# Patient Record
Sex: FEMALE | Race: WHITE | Employment: OTHER | URBAN - METROPOLITAN AREA
[De-identification: names, ages, dates, MRNs, and addresses within clinical notes are randomized per-mention and may not be internally consistent; named-entity substitution may affect disease eponyms.]

---

## 2022-09-28 ENCOUNTER — HOSPITAL ENCOUNTER (EMERGENCY)
Age: 77
Discharge: HOME | End: 2022-09-28
Attending: EMERGENCY MEDICINE
Payer: MEDICARE

## 2022-09-28 ENCOUNTER — APPOINTMENT (OUTPATIENT)
Dept: CT IMAGING | Age: 77
End: 2022-09-28
Payer: MEDICARE

## 2022-09-28 VITALS
HEIGHT: 57 IN | SYSTOLIC BLOOD PRESSURE: 174 MMHG | BODY MASS INDEX: 27.18 KG/M2 | DIASTOLIC BLOOD PRESSURE: 87 MMHG | WEIGHT: 126 LBS | OXYGEN SATURATION: 97 % | RESPIRATION RATE: 16 BRPM | TEMPERATURE: 97.8 F | HEART RATE: 96 BPM

## 2022-09-28 DIAGNOSIS — S02.2XXA CLOSED FRACTURE OF NASAL BONE, INITIAL ENCOUNTER: ICD-10-CM
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
## 2022-09-28 PROCEDURE — 70486 CT MAXILLOFACIAL W/O DYE: CPT

## 2022-09-28 PROCEDURE — 99285 EMERGENCY DEPT VISIT HI MDM: CPT

## 2022-09-28 PROCEDURE — 70450 CT HEAD/BRAIN W/O DYE: CPT

## 2022-09-28 PROCEDURE — 72125 CT NECK SPINE W/O DYE: CPT

## 2022-09-28 PROCEDURE — 6830039000 HC L3 TRAUMA ALERT

## 2022-09-28 RX ORDER — TRAMADOL HYDROCHLORIDE 50 MG/1
50 TABLET ORAL EVERY 4 HOURS PRN
Qty: 18 TABLET | Refills: 0 | Status: SHIPPED | OUTPATIENT
Start: 2022-09-28 | End: 2022-10-01

## 2022-09-28 ASSESSMENT — ENCOUNTER SYMPTOMS
ABDOMINAL PAIN: 0
VOMITING: 0
BACK PAIN: 0
NAUSEA: 0
DIARRHEA: 0
SORE THROAT: 0
SHORTNESS OF BREATH: 0
COUGH: 0

## 2022-09-28 NOTE — ED PROVIDER NOTES
[address] [facility] ED  eMERGENCYdEPARTMENT eNCOUnter      Pt Name: Taye Zarate  MRN: [id number]  Felixgfkathryn 1945  Date of evaluation: 9/28/2022  Vitaly Quach MD    CHIEF COMPLAINT           HPI  Taye Zarate is a 78 y.o. female per chart review has no pmh presents to the ED s/p fall. Pt notes just prior to arrival she tripped at [facility] and fell forward. Pt denies LOC. Pt notes mild, constant, aching, nose pain. Pt denies fever, neck pain, cp, sob, ab pain, dysuria, diarrhea. ROS  Review of Systems   Constitutional:  Negative for activity change, chills and fever. HENT:  Negative for ear pain and sore throat. Nose pain   Eyes:  Negative for visual disturbance. Respiratory:  Negative for cough and shortness of breath. Cardiovascular:  Negative for chest pain, palpitations and leg swelling. Gastrointestinal:  Negative for abdominal pain, diarrhea, nausea and vomiting. Genitourinary:  Negative for dysuria. Musculoskeletal:  Negative for back pain. Skin:  Negative for rash. Neurological:  Negative for dizziness and weakness. Except as noted above the remainder of the review of systems was reviewed and negative. PAST MEDICAL HISTORY   No past medical history on file. SURGICAL HISTORY     No past surgical history on file. CURRENTMEDICATIONS       Previous Medications    No medications on file       ALLERGIES     Patient has no known allergies. FAMILY HISTORY     No family history on file. SOCIAL HISTORY       Social History     Socioeconomic History    Marital status:          PHYSICAL EXAM       ED Triage Vitals   BP Temp Temp src Pulse Resp SpO2 Height Weight   -- -- -- -- -- -- -- --       Physical Exam  Vitals and nursing note reviewed. Constitutional:       Appearance: She is well-developed. HENT:      Head: Normocephalic.       Right Ear: External ear normal.      Left Ear: External ear normal.   Eyes:      Conjunctiva/sclera: Conjunctivae normal.      Pupils: Pupils are equal, round, and reactive to light. Cardiovascular:      Rate and Rhythm: Normal rate and regular rhythm. Heart sounds: Normal heart sounds. Pulmonary:      Effort: Pulmonary effort is normal.      Breath sounds: Normal breath sounds. Abdominal:      General: Bowel sounds are normal. There is no distension. Palpations: Abdomen is soft. Tenderness: no abdominal tenderness   Musculoskeletal:         General: Normal range of motion. Cervical back: Normal range of motion and neck supple. No tenderness. Skin:     General: Skin is warm and dry. Neurological:      Mental Status: She is alert and oriented to person, place, and time. Psychiatric:         Mood and Affect: Mood normal.         MDM  79 yo female presents to the ED s/p mechanical fall. Pt is afebrile, hemodynamically stable. Pt did not want anything for pain. CT head, cspine negative. CT facial bones shows subtle fx of nasal bone. Pt reassessed and doing well. Pt's wounds cleaned. Pt given prescription for tramadol, given fall warning signs and will f/u with pcp. FINAL IMPRESSION      1. Fall, initial encounter    2.  Closed fracture of nasal bone, initial encounter          DISPOSITION/PLAN   DISPOSITION Decision To Discharge 09/28/2022 02:46:43 PM        DISCHARGE MEDICATIONS:  [unfilled]         Analy Moran MD(electronically signed)  Attending Emergency Physician            Analy Moran MD  09/28/22 4213

## 2022-09-28 NOTE — ED TRIAGE NOTES
Pt arrived via EMS. Pt states she was walking into [facility] and tripped and fell hitting her face on the ground. Pt has laceration to bridge of nose and a bloody nose PTA. Pt denies any LOC or thinners   Pt denies any head, neck or back pain.

## 2022-10-08 ENCOUNTER — APPOINTMENT (OUTPATIENT)
Dept: CT IMAGING | Age: 77
End: 2022-10-08
Payer: MEDICARE

## 2022-10-08 ENCOUNTER — HOSPITAL ENCOUNTER (EMERGENCY)
Age: 77
Discharge: HOME | End: 2022-10-08
Attending: EMERGENCY MEDICINE
Payer: MEDICARE

## 2022-10-08 VITALS
HEART RATE: 93 BPM | BODY MASS INDEX: 27.18 KG/M2 | OXYGEN SATURATION: 98 % | HEIGHT: 57 IN | SYSTOLIC BLOOD PRESSURE: 148 MMHG | DIASTOLIC BLOOD PRESSURE: 57 MMHG | WEIGHT: 126 LBS | RESPIRATION RATE: 20 BRPM | TEMPERATURE: 98.9 F

## 2022-10-08 DIAGNOSIS — S09.90XA CLOSED HEAD INJURY, INITIAL ENCOUNTER: Primary | ICD-10-CM
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
## 2022-10-08 PROCEDURE — 99285 EMERGENCY DEPT VISIT HI MDM: CPT

## 2022-10-08 PROCEDURE — 70450 CT HEAD/BRAIN W/O DYE: CPT

## 2022-10-08 PROCEDURE — 72125 CT NECK SPINE W/O DYE: CPT

## 2022-10-08 ASSESSMENT — ENCOUNTER SYMPTOMS
BACK PAIN: 0
VOMITING: 0
COUGH: 0
ABDOMINAL PAIN: 0
SHORTNESS OF BREATH: 0
NAUSEA: 0
SORE THROAT: 0
DIARRHEA: 0

## 2022-10-08 ASSESSMENT — PAIN DESCRIPTION - PAIN TYPE: TYPE: ACUTE PAIN

## 2022-10-08 ASSESSMENT — PAIN DESCRIPTION - LOCATION: LOCATION: HEAD

## 2022-10-08 ASSESSMENT — PAIN DESCRIPTION - ORIENTATION: ORIENTATION: POSTERIOR

## 2022-10-08 ASSESSMENT — PAIN - FUNCTIONAL ASSESSMENT: PAIN_FUNCTIONAL_ASSESSMENT: 0-10

## 2022-10-08 NOTE — ED PROVIDER NOTES
[address] [facility] ED  eMERGENCYdEPARTMENT eNCOUnter      Pt Name: Obdulio Frenhc  MRN: [id number]  Felixgfkathryn 1945  Date of evaluation: 10/8/2022  Surekha Dc MD    CHIEF COMPLAINT           HPI  Obdulio French is a 78 y.o. female per chart review has a h/o DM II, HTN, Hpl, OA presents to the ED s/p fall. Pt notes she was vacuuming just prior to arrival and she fell backwards and hit her head. Pt denies headache, LOC, neck pain, cp, sob, ab pain, dysuria, diarrhea. ROS  Review of Systems   Constitutional:  Negative for activity change, chills and fever. HENT:  Negative for ear pain and sore throat. Eyes:  Negative for visual disturbance. Respiratory:  Negative for cough and shortness of breath. Cardiovascular:  Negative for chest pain, palpitations and leg swelling. Gastrointestinal:  Negative for abdominal pain, diarrhea, nausea and vomiting. Genitourinary:  Negative for dysuria. Musculoskeletal:  Negative for back pain. Skin:  Negative for rash. Neurological:  Negative for dizziness and weakness. Except as noted above the remainder of the review of systems was reviewed and negative. PAST MEDICAL HISTORY     Past Medical History:   Diagnosis Date    Arthritis     Diabetes mellitus ([address]     Hyperlipidemia     Hypertension          SURGICAL HISTORY       Past Surgical History:   Procedure Laterality Date    HYSTERECTOMY (CERVIX STATUS UNKNOWN)           CURRENTMEDICATIONS       Previous Medications    No medications on file       ALLERGIES     Patient has no known allergies. FAMILY HISTORY     History reviewed. No pertinent family history. SOCIAL HISTORY       Social History     Socioeconomic History    Marital status:       Spouse name: None    Number of children: None    Years of education: None    Highest education level: None   Tobacco Use    Smoking status: Never    Smokeless tobacco: Never   Substance and Sexual Activity    Alcohol use: Never Drug use: Never         PHYSICAL EXAM       ED Triage Vitals [10/08/22 1420]   BP Temp Temp Source Heart Rate Resp SpO2 Height Weight   (!) 156/78 98.9 °F (37.2 °C) Oral 98 20 98 % 4' 9\" (1.448 m) 126 lb (57.2 kg)       Physical Exam  Vitals and nursing note reviewed. Constitutional:       Appearance: She is well-developed. HENT:      Head: Normocephalic. Comments: 1 cm superficial laceration noted on back of head. No active bleeding. Right Ear: External ear normal.      Left Ear: External ear normal.   Eyes:      Conjunctiva/sclera: Conjunctivae normal.      Pupils: Pupils are equal, round, and reactive to light. Cardiovascular:      Rate and Rhythm: Normal rate and regular rhythm. Heart sounds: Normal heart sounds. Pulmonary:      Effort: Pulmonary effort is normal.      Breath sounds: Normal breath sounds. Abdominal:      General: Bowel sounds are normal. There is no distension. Palpations: Abdomen is soft. Tenderness: no abdominal tenderness   Musculoskeletal:         General: Normal range of motion. Cervical back: Normal range of motion and neck supple. No tenderness. Skin:     General: Skin is warm and dry. Neurological:      Mental Status: She is alert and oriented to person, place, and time. Psychiatric:         Mood and Affect: Mood normal.         MDM  77 yo female presents to the ED s/p fall with closed head injury. Pt is afebrile, hemodynamically stable. Pt did not want anything for pain. CT head, cspine negative. Pt reassessed and doing well. Pt's wound cleaned and dressed in the ED. Pt given fall, closed head injury warning signs and will f/u with pcp. FINAL IMPRESSION      1. Closed head injury, initial encounter    2.  Fall, initial encounter          DISPOSITION/PLAN   DISPOSITION Decision To Discharge 10/08/2022 03:27:10 PM        DISCHARGE MEDICATIONS:  [unfilled]         Lidia Nettles MD(electronically signed)  Attending Emergency Physician            Jose Hernandez MD  10/08/22 8091

## 2022-10-08 NOTE — ED NOTES
Pt resting in bed. Family bedside. No obvious s/s of distress/deformities noted.       Raghavendra Guevara RN  10/08/22 [id number]

## 2022-10-08 NOTE — ED NOTES
Pt taken to ct via ASHLIE Hayden by this RN at this time.       Claudia Painting, NIRU  10/08/22 3426

## 2022-10-08 NOTE — ED TRIAGE NOTES
Pt to ed from hoem via triage with c/o mild head pain after fall. Pt states she was vacuuming and had a fall and hit head on the ground. 1/2 in lac noted to posterior scalp, bleeding controlled. Pt denies LOC, pt denies blood thinner use. Pt denies nausea, headache, vision changes or dizziness  Respirations even and unlabored.  Skin WDI  No s/s of distress noted

## 2023-02-01 PROBLEM — N95.2 ATROPHIC VULVOVAGINITIS: Status: ACTIVE | Noted: 2023-02-01

## 2023-02-01 PROBLEM — H90.6 MIXED HEARING LOSS, BILATERAL: Status: ACTIVE | Noted: 2023-02-01

## 2023-02-01 PROBLEM — E66.3 OVERWEIGHT WITH BODY MASS INDEX (BMI) OF 28 TO 28.9 IN ADULT: Status: ACTIVE | Noted: 2023-02-01

## 2023-02-01 PROBLEM — R79.89 ELEVATED PLATELET COUNT: Status: ACTIVE | Noted: 2023-02-01

## 2023-02-01 PROBLEM — H72.93 PERFORATION OF BOTH TYMPANIC MEMBRANES: Status: ACTIVE | Noted: 2023-02-01

## 2023-02-01 PROBLEM — Z90.710 HISTORY OF HYSTERECTOMY FOR BENIGN DISEASE: Status: ACTIVE | Noted: 2023-02-01

## 2023-02-01 PROBLEM — H72.90 TYMPANIC MEMBRANE PERFORATION: Status: ACTIVE | Noted: 2023-02-01

## 2023-02-01 PROBLEM — E11.9 TYPE 2 DIABETES MELLITUS WITHOUT COMPLICATION, WITHOUT LONG-TERM CURRENT USE OF INSULIN (MULTI): Status: ACTIVE | Noted: 2023-02-01

## 2023-02-01 PROBLEM — I10 ESSENTIAL HYPERTENSION: Status: ACTIVE | Noted: 2023-02-01

## 2023-02-01 PROBLEM — I87.8 VENOUS STASIS: Status: ACTIVE | Noted: 2023-02-01

## 2023-02-01 PROBLEM — H66.90 CHRONIC OTITIS MEDIA: Status: ACTIVE | Noted: 2023-02-01

## 2023-02-01 PROBLEM — M25.569 KNEE PAIN: Status: ACTIVE | Noted: 2023-02-01

## 2023-02-01 PROBLEM — R27.0 ATAXIA: Status: ACTIVE | Noted: 2023-02-01

## 2023-02-01 PROBLEM — R29.6 FREQUENT FALLS: Status: ACTIVE | Noted: 2023-02-01

## 2023-02-01 PROBLEM — E66.3 OVERWEIGHT WITH BODY MASS INDEX (BMI) OF 25 TO 25.9 IN ADULT: Status: ACTIVE | Noted: 2023-02-01

## 2023-02-01 PROBLEM — H92.10 OTORRHEA: Status: ACTIVE | Noted: 2023-02-01

## 2023-02-01 PROBLEM — N81.9 PROLAPSE OF FEMALE PELVIC ORGANS: Status: ACTIVE | Noted: 2023-02-01

## 2023-02-01 PROBLEM — E66.3 OVERWEIGHT WITH BODY MASS INDEX (BMI) OF 27 TO 27.9 IN ADULT: Status: ACTIVE | Noted: 2023-02-01

## 2023-02-01 PROBLEM — H91.90 HEARING LOSS: Status: ACTIVE | Noted: 2023-02-01

## 2023-02-01 PROBLEM — B35.1 NAIL FUNGUS: Status: ACTIVE | Noted: 2023-02-01

## 2023-02-01 RX ORDER — CARVEDILOL 12.5 MG/1
1 TABLET ORAL
COMMUNITY
Start: 2022-01-11 | End: 2023-07-27 | Stop reason: SDUPTHER

## 2023-02-01 RX ORDER — METFORMIN HYDROCHLORIDE EXTENDED-RELEASE TABLETS 1000 MG/1
1000 TABLET, FILM COATED, EXTENDED RELEASE ORAL DAILY
COMMUNITY

## 2023-02-01 RX ORDER — VIT C/E/ZN/COPPR/LUTEIN/ZEAXAN 250MG-90MG
1 CAPSULE ORAL 2 TIMES DAILY
COMMUNITY
Start: 2021-09-02

## 2023-02-01 RX ORDER — PREDNISONE 20 MG/1
20 TABLET ORAL DAILY
COMMUNITY
End: 2023-03-15 | Stop reason: ALTCHOICE

## 2023-02-01 RX ORDER — TURMERIC ROOT EXTRACT 500 MG
1 TABLET ORAL DAILY
COMMUNITY
Start: 2021-09-02

## 2023-02-01 RX ORDER — DULAGLUTIDE 0.75 MG/.5ML
INJECTION, SOLUTION SUBCUTANEOUS
COMMUNITY
End: 2023-11-30 | Stop reason: SDUPTHER

## 2023-02-01 RX ORDER — HYDROCHLOROTHIAZIDE 25 MG/1
1 TABLET ORAL DAILY
COMMUNITY
Start: 2022-03-01 | End: 2023-07-05 | Stop reason: SDUPTHER

## 2023-02-01 RX ORDER — LISINOPRIL 40 MG/1
1 TABLET ORAL DAILY
COMMUNITY
Start: 2022-03-01 | End: 2023-06-01 | Stop reason: SDUPTHER

## 2023-02-01 RX ORDER — PHENOL 1.4 %
1 AEROSOL, SPRAY (ML) MUCOUS MEMBRANE DAILY
COMMUNITY
Start: 2021-09-02

## 2023-02-01 RX ORDER — ASPIRIN 325 MG
1 TABLET, DELAYED RELEASE (ENTERIC COATED) ORAL DAILY
COMMUNITY
Start: 2021-09-02

## 2023-02-01 RX ORDER — ESTRADIOL 0.1 MG/G
CREAM VAGINAL
COMMUNITY
Start: 2022-10-25 | End: 2023-03-15 | Stop reason: ALTCHOICE

## 2023-02-01 RX ORDER — GLIPIZIDE 10 MG/1
1 TABLET ORAL
COMMUNITY
Start: 2022-03-29 | End: 2023-07-05 | Stop reason: SDUPTHER

## 2023-02-01 RX ORDER — TRIAMCINOLONE ACETONIDE 1 MG/G
CREAM TOPICAL
COMMUNITY
Start: 2022-10-11

## 2023-02-01 RX ORDER — BLOOD-GLUCOSE METER
1 EACH MISCELLANEOUS 2 TIMES DAILY
COMMUNITY
Start: 2022-03-01 | End: 2023-12-11 | Stop reason: SDUPTHER

## 2023-02-01 RX ORDER — LANCETS 33 GAUGE
1 EACH MISCELLANEOUS 2 TIMES DAILY
COMMUNITY

## 2023-02-01 RX ORDER — AMLODIPINE BESYLATE 10 MG/1
1 TABLET ORAL DAILY
COMMUNITY
Start: 2022-03-01 | End: 2023-11-30 | Stop reason: SDUPTHER

## 2023-03-13 ENCOUNTER — LAB (OUTPATIENT)
Dept: LAB | Facility: LAB | Age: 78
End: 2023-03-13
Payer: MEDICARE

## 2023-03-13 DIAGNOSIS — E11.9 TYPE 2 DIABETES MELLITUS WITHOUT COMPLICATION, WITHOUT LONG-TERM CURRENT USE OF INSULIN (MULTI): ICD-10-CM

## 2023-03-13 LAB
ALANINE AMINOTRANSFERASE (SGPT) (U/L) IN SER/PLAS: 22 U/L (ref 7–45)
ALBUMIN (G/DL) IN SER/PLAS: 4.7 G/DL (ref 3.4–5)
ALKALINE PHOSPHATASE (U/L) IN SER/PLAS: 72 U/L (ref 33–136)
ANION GAP IN SER/PLAS: 13 MMOL/L (ref 10–20)
ASPARTATE AMINOTRANSFERASE (SGOT) (U/L) IN SER/PLAS: 23 U/L (ref 9–39)
BILIRUBIN TOTAL (MG/DL) IN SER/PLAS: 1.1 MG/DL (ref 0–1.2)
CALCIUM (MG/DL) IN SER/PLAS: 11 MG/DL (ref 8.6–10.3)
CARBON DIOXIDE, TOTAL (MMOL/L) IN SER/PLAS: 30 MMOL/L (ref 21–32)
CHLORIDE (MMOL/L) IN SER/PLAS: 100 MMOL/L (ref 98–107)
CREATININE (MG/DL) IN SER/PLAS: 0.68 MG/DL (ref 0.5–1.05)
GFR FEMALE: 90 ML/MIN/1.73M2
GLUCOSE (MG/DL) IN SER/PLAS: 93 MG/DL (ref 74–99)
POTASSIUM (MMOL/L) IN SER/PLAS: 4.7 MMOL/L (ref 3.5–5.3)
PROTEIN TOTAL: 7.9 G/DL (ref 6.4–8.2)
SODIUM (MMOL/L) IN SER/PLAS: 138 MMOL/L (ref 136–145)
UREA NITROGEN (MG/DL) IN SER/PLAS: 15 MG/DL (ref 6–23)

## 2023-03-13 PROCEDURE — 36415 COLL VENOUS BLD VENIPUNCTURE: CPT

## 2023-03-13 PROCEDURE — 83036 HEMOGLOBIN GLYCOSYLATED A1C: CPT

## 2023-03-13 PROCEDURE — 80053 COMPREHEN METABOLIC PANEL: CPT

## 2023-03-14 LAB
ESTIMATED AVERAGE GLUCOSE FOR HBA1C: 180 MG/DL
HEMOGLOBIN A1C/HEMOGLOBIN TOTAL IN BLOOD: 7.9 %

## 2023-03-15 ENCOUNTER — OFFICE VISIT (OUTPATIENT)
Dept: PRIMARY CARE | Facility: CLINIC | Age: 78
End: 2023-03-15
Payer: MEDICARE

## 2023-03-15 VITALS
DIASTOLIC BLOOD PRESSURE: 62 MMHG | HEART RATE: 88 BPM | WEIGHT: 117 LBS | OXYGEN SATURATION: 98 % | HEIGHT: 57 IN | SYSTOLIC BLOOD PRESSURE: 120 MMHG | RESPIRATION RATE: 16 BRPM | BODY MASS INDEX: 25.24 KG/M2 | TEMPERATURE: 97 F

## 2023-03-15 DIAGNOSIS — R79.89 ELEVATED PLATELET COUNT: ICD-10-CM

## 2023-03-15 DIAGNOSIS — Z00.00 ENCOUNTER FOR MEDICARE ANNUAL WELLNESS EXAM: Primary | ICD-10-CM

## 2023-03-15 DIAGNOSIS — R29.6 FREQUENT FALLS: ICD-10-CM

## 2023-03-15 DIAGNOSIS — R00.2 PALPITATION: ICD-10-CM

## 2023-03-15 DIAGNOSIS — I87.8 VENOUS STASIS: ICD-10-CM

## 2023-03-15 DIAGNOSIS — Z00.00 ROUTINE GENERAL MEDICAL EXAMINATION AT HEALTH CARE FACILITY: ICD-10-CM

## 2023-03-15 DIAGNOSIS — E11.9 TYPE 2 DIABETES MELLITUS WITHOUT COMPLICATION, WITHOUT LONG-TERM CURRENT USE OF INSULIN (MULTI): ICD-10-CM

## 2023-03-15 DIAGNOSIS — I10 ESSENTIAL HYPERTENSION: ICD-10-CM

## 2023-03-15 DIAGNOSIS — R94.31 ABNORMAL EKG: ICD-10-CM

## 2023-03-15 PROBLEM — Z90.710 HISTORY OF HYSTERECTOMY FOR BENIGN DISEASE: Status: RESOLVED | Noted: 2023-02-01 | Resolved: 2023-03-15

## 2023-03-15 PROBLEM — E66.3 OVERWEIGHT WITH BODY MASS INDEX (BMI) OF 28 TO 28.9 IN ADULT: Status: RESOLVED | Noted: 2023-02-01 | Resolved: 2023-03-15

## 2023-03-15 PROBLEM — M25.569 KNEE PAIN: Status: RESOLVED | Noted: 2023-02-01 | Resolved: 2023-03-15

## 2023-03-15 PROBLEM — H92.10 OTORRHEA: Status: RESOLVED | Noted: 2023-02-01 | Resolved: 2023-03-15

## 2023-03-15 PROBLEM — E66.3 OVERWEIGHT WITH BODY MASS INDEX (BMI) OF 27 TO 27.9 IN ADULT: Status: RESOLVED | Noted: 2023-02-01 | Resolved: 2023-03-15

## 2023-03-15 PROBLEM — H72.90 TYMPANIC MEMBRANE PERFORATION: Status: RESOLVED | Noted: 2023-02-01 | Resolved: 2023-03-15

## 2023-03-15 PROBLEM — G31.84 MILD COGNITIVE IMPAIRMENT: Status: ACTIVE | Noted: 2023-03-15

## 2023-03-15 PROBLEM — H90.6 MIXED HEARING LOSS, BILATERAL: Status: RESOLVED | Noted: 2023-02-01 | Resolved: 2023-03-15

## 2023-03-15 PROBLEM — H66.90 CHRONIC OTITIS MEDIA: Status: RESOLVED | Noted: 2023-02-01 | Resolved: 2023-03-15

## 2023-03-15 PROBLEM — H91.90 HEARING LOSS: Status: RESOLVED | Noted: 2023-02-01 | Resolved: 2023-03-15

## 2023-03-15 PROCEDURE — 1170F FXNL STATUS ASSESSED: CPT | Performed by: FAMILY MEDICINE

## 2023-03-15 PROCEDURE — 3074F SYST BP LT 130 MM HG: CPT | Performed by: FAMILY MEDICINE

## 2023-03-15 PROCEDURE — G0439 PPPS, SUBSEQ VISIT: HCPCS | Performed by: FAMILY MEDICINE

## 2023-03-15 PROCEDURE — 3078F DIAST BP <80 MM HG: CPT | Performed by: FAMILY MEDICINE

## 2023-03-15 PROCEDURE — 93000 ELECTROCARDIOGRAM COMPLETE: CPT | Performed by: FAMILY MEDICINE

## 2023-03-15 PROCEDURE — 4010F ACE/ARB THERAPY RXD/TAKEN: CPT | Performed by: FAMILY MEDICINE

## 2023-03-15 PROCEDURE — 1160F RVW MEDS BY RX/DR IN RCRD: CPT | Performed by: FAMILY MEDICINE

## 2023-03-15 PROCEDURE — 99213 OFFICE O/P EST LOW 20 MIN: CPT | Performed by: FAMILY MEDICINE

## 2023-03-15 PROCEDURE — 1159F MED LIST DOCD IN RCRD: CPT | Performed by: FAMILY MEDICINE

## 2023-03-15 PROCEDURE — 1036F TOBACCO NON-USER: CPT | Performed by: FAMILY MEDICINE

## 2023-03-15 PROCEDURE — 3051F HG A1C>EQUAL 7.0%<8.0%: CPT | Performed by: FAMILY MEDICINE

## 2023-03-15 PROCEDURE — 99497 ADVNCD CARE PLAN 30 MIN: CPT | Performed by: FAMILY MEDICINE

## 2023-03-15 ASSESSMENT — ACTIVITIES OF DAILY LIVING (ADL)
DRESSING: INDEPENDENT
GROCERY_SHOPPING: NEEDS ASSISTANCE
TAKING_MEDICATION: NEEDS ASSISTANCE
BATHING: INDEPENDENT
DOING_HOUSEWORK: NEEDS ASSISTANCE
MANAGING_FINANCES: INDEPENDENT

## 2023-03-15 ASSESSMENT — ENCOUNTER SYMPTOMS
DEPRESSION: 0
LOSS OF SENSATION IN FEET: 0
OCCASIONAL FEELINGS OF UNSTEADINESS: 1

## 2023-03-15 ASSESSMENT — PATIENT HEALTH QUESTIONNAIRE - PHQ9
2. FEELING DOWN, DEPRESSED OR HOPELESS: NOT AT ALL
1. LITTLE INTEREST OR PLEASURE IN DOING THINGS: NOT AT ALL
SUM OF ALL RESPONSES TO PHQ9 QUESTIONS 1 AND 2: 0

## 2023-03-15 NOTE — PROGRESS NOTES
Subjective   Reason for Visit: Mirta Christianson is a 77 y.o. female here for a Medicare Wellness visit.   3 falls in 6mo  Also ekg done today and abn ekg      Hba1c 7.9    PCovid vax: x 5  CRC: 2014  Mammogram: 12/2022  Pap: hysterectomy  Lmp: hysterectomyast Medical, Surgical, and Family History reviewed and updated in chart.    Reviewed all medications by prescribing practitioner or clinical pharmacist (such as prescriptions, OTCs, herbal therapies and supplements) and documented in the medical record.  Medicare Annual Wellness Visit Subsequent, Diabetes, and Fall (Slipped in bathroom-has abrasion on right forearm)  HPI    Patient Self Assessment of Health Status  Patient Self Assessment: Good    Nutrition and Exercise  Current Diet: Diabetic Diet  Adequate Fluid Intake: Yes  Caffeine: Yes  Exercise Frequency: Regularly    Functional Ability/Level of Safety  Cognitive Impairment Observed: No cognitive impairment observed    Home Safety Risk Factors: None    Patient Care Team:  Madeline Sweet MD as PCP - General    HPI  Patient Active Problem List   Diagnosis    Ataxia    Atrophic vulvovaginitis    Elevated platelet count    Essential hypertension    Frequent falls    Nail fungus    Perforation of both tympanic membranes    Prolapse of female pelvic organs    Type 2 diabetes mellitus without complication, without long-term current use of insulin (CMS/[facility])    Overweight with body mass index (BMI) of 25 to 25.9 in adult    Venous stasis    Encounter for Medicare annual wellness exam      Past Surgical History:   Procedure Laterality Date    APPENDECTOMY      COLONOSCOPY  2014    per pt    HYSTERECTOMY  1997       Review of Systems  This patient has   NO history of recent Covid nor flu symptoms,  NO Fever nor chills,  NO Chest pain, shortness of breath nor paroxysmal nocturnal dyspnea,  NO Nausea, vomiting, nor diarrhea,  NO Hematochezia nor melena,  NO Dysuria, hematuria, nor new incontinence issues  NO new severe  "headaches nor neurological complaints,  NO new issues with anxiety nor depression nor new psychiatric complaints,  NO suicidal nor homicidal ideations.  Reminded of eye exam necessity    OBJECTIVE:  /62 (BP Location: Left arm, Patient Position: Sitting, BP Cuff Size: Adult)   Pulse 88   Temp 36.1 °C (97 °F) (Temporal)   Resp 16   Ht 1.448 m (4' 9\") Comment: 4'9  Wt 53.1 kg (117 lb) Comment: 117 lb  LMP  (LMP Unknown)   SpO2 98%   BMI 25.32 kg/m²      General:  alert, oriented, no acute distress.  No obvious skin rashes noted.   No gait disturbance noted.    Mood is pleasant, not tearful, no signs of emotional distress.  Not appearing intoxicated or altered.   No voiced delusions,   Normal, appropriate behavior.    HEENT: Normocephalic, atraumatic,   Pupils round, reactive to light  Extraocular motions intact and wnl  Tympanic membranes normal    Neck: no nuchal rigidity  No masses palpable.  No carotid bruits.  No thyromegaly.    Respiratory: Equal breath sounds  No wheezes,    rales,    nor rhonchi  No respiratory distress.    Heart: Regular rate and rhythm, no    murmurs  no rubs/gallops    Abdomen: no masses palpable, no rebound nor guarding, no rebound nor guarding.    Extremities: NO cyanosis noted, no clubbing.   No edema noted.  2+dorsalis pedis pulses.  3-4cm healing abrasion  Foot exam wnl    Normal-not antalgic, steady gait. W CANE  Ekg abn av conduction issue  Brief mmse   24-5/30  H of h    Lab on 03/13/2023   Component Date Value Ref Range Status    Hemoglobin A1C 03/13/2023 7.9 (A)  % Final         Diagnosis of Diabetes-Adults   Non-Diabetic: < or = 5.6%   Increased risk for developing diabetes: 5.7-6.4%   Diagnostic of diabetes: > or = 6.5%  .       Monitoring of Diabetes                Age (y)     Therapeutic Goal (%)   Adults:          >18           <7.0   Pediatrics:    13-18           <7.5                   7-12           <8.0                   0- 6            7.5-8.5   American " Diabetes Association. Diabetes Care 33(S1), Jan 2010.    Estimated Average Glucose 03/13/2023 180  MG/DL Final    Glucose 03/13/2023 93  74 - 99 mg/dL Final    Sodium 03/13/2023 138  136 - 145 mmol/L Final    Potassium 03/13/2023 4.7  3.5 - 5.3 mmol/L Final    Chloride 03/13/2023 100  98 - 107 mmol/L Final    Bicarbonate 03/13/2023 30  21 - 32 mmol/L Final    Anion Gap 03/13/2023 13  10 - 20 mmol/L Final    Urea Nitrogen 03/13/2023 15  6 - 23 mg/dL Final    Creatinine 03/13/2023 0.68  0.50 - 1.05 mg/dL Final    GFR Female 03/13/2023 90  >90 mL/min/1.73m2 Final     CALCULATIONS OF ESTIMATED GFR ARE PERFORMED   USING THE 2021 CKD-EPI STUDY REFIT EQUATION   WITHOUT THE RACE VARIABLE FOR THE IDMS-TRACEABLE   CREATININE METHODS.    https://jasn.asnjournals.org/content/early/2021/09/22/ASN.4217903335    Calcium 03/13/2023 11.0 (H)  8.6 - 10.3 mg/dL Final    Albumin 03/13/2023 4.7  3.4 - 5.0 g/dL Final    Alkaline Phosphatase 03/13/2023 72  33 - 136 U/L Final    Total Protein 03/13/2023 7.9  6.4 - 8.2 g/dL Final    AST 03/13/2023 23  9 - 39 U/L Final    Total Bilirubin 03/13/2023 1.1  0.0 - 1.2 mg/dL Final    ALT (SGPT) 03/13/2023 22  7 - 45 U/L Final     Patients treated with Sulfasalazine may generate    falsely decreased results for ALT.   Legacy Encounter on 02/28/2023   Component Date Value Ref Range Status    WBC 02/28/2023 8.0  4.4 - 11.3 x10E9/L Final    RBC 02/28/2023 4.96  4.00 - 5.20 x10E12/L Final    Hemoglobin 02/28/2023 14.3  12.0 - 16.0 g/dL Final    Hematocrit 02/28/2023 43.0  36.0 - 46.0 % Final    MCV 02/28/2023 87  80 - 100 fL Final    MCHC 02/28/2023 33.3  32.0 - 36.0 g/dL Final    Platelets 02/28/2023 526 (H)  150 - 450 x10E9/L Final    RDW 02/28/2023 14.4  11.5 - 14.5 % Final    Neutrophils % 02/28/2023 65.7  40.0 - 80.0 % Final    Immature Granulocytes %, Automated 02/28/2023 0.1  0.0 - 0.9 % Final    Comment:  Immature Granulocyte Count (IG) includes promyelocytes,    myelocytes and metamyelocytes  but does not include bands.   Percent differential counts (%) should be interpreted in the   context of the absolute cell counts (cells/L).      Lymphocytes % 02/28/2023 24.1  13.0 - 44.0 % Final    Monocytes % 02/28/2023 8.6  2.0 - 10.0 % Final    Eosinophils % 02/28/2023 0.9  0.0 - 6.0 % Final    Basophils % 02/28/2023 0.6  0.0 - 2.0 % Final    Neutrophils Absolute 02/28/2023 5.28  1.60 - 5.50 x10E9/L Final    Lymphocytes Absolute 02/28/2023 1.94  0.80 - 3.00 x10E9/L Final    Monocytes Absolute 02/28/2023 0.69  0.05 - 0.80 x10E9/L Final    Eosinophils Absolute 02/28/2023 0.07  0.00 - 0.40 x10E9/L Final    Basophils Absolute 02/28/2023 0.05  0.00 - 0.10 x10E9/L Final    Glucose 02/28/2023 137 (H)  74 - 99 mg/dL Final    Sodium 02/28/2023 137  136 - 145 mmol/L Final    Potassium 02/28/2023 3.8  3.5 - 5.3 mmol/L Final    Chloride 02/28/2023 100  98 - 107 mmol/L Final    Bicarbonate 02/28/2023 26  21 - 32 mmol/L Final    Anion Gap 02/28/2023 15  10 - 20 mmol/L Final    Urea Nitrogen 02/28/2023 18  6 - 23 mg/dL Final    Creatinine 02/28/2023 0.64  0.50 - 1.05 mg/dL Final    GFR Female 02/28/2023 >90  >90 mL/min/1.73m2 Final    Comment:  CALCULATIONS OF ESTIMATED GFR ARE PERFORMED   USING THE 2021 CKD-EPI STUDY REFIT EQUATION   WITHOUT THE RACE VARIABLE FOR THE IDMS-TRACEABLE   CREATININE METHODS.    https://jasn.asnjournals.org/content/early/2021/09/22/ASN.7361814843      Calcium 02/28/2023 9.9  8.6 - 10.3 mg/dL Final    Albumin 02/28/2023 4.4  3.4 - 5.0 g/dL Final    Alkaline Phosphatase 02/28/2023 51  33 - 136 U/L Final    Total Protein 02/28/2023 6.8  6.4 - 8.2 g/dL Final    AST 02/28/2023 19  9 - 39 U/L Final    Total Bilirubin 02/28/2023 0.8  0.0 - 1.2 mg/dL Final    ALT (SGPT) 02/28/2023 18  7 - 45 U/L Final    Comment:  Patients treated with Sulfasalazine may generate    falsely decreased results for ALT.     Legacy Encounter on 01/31/2023   Component Date Value Ref Range Status    WBC 01/31/2023 8.2  4.4 - 11.3  x10E9/L Final    RBC 01/31/2023 5.06  4.00 - 5.20 x10E12/L Final    Hemoglobin 01/31/2023 14.5  12.0 - 16.0 g/dL Final    Hematocrit 01/31/2023 43.5  36.0 - 46.0 % Final    MCV 01/31/2023 86  80 - 100 fL Final    MCHC 01/31/2023 33.3  32.0 - 36.0 g/dL Final    Platelets 01/31/2023 515 (H)  150 - 450 x10E9/L Final    RDW 01/31/2023 14.5  11.5 - 14.5 % Final    Neutrophils % 01/31/2023 62.6  40.0 - 80.0 % Final    Immature Granulocytes %, Automated 01/31/2023 0.1  0.0 - 0.9 % Final    Comment:  Immature Granulocyte Count (IG) includes promyelocytes,    myelocytes and metamyelocytes but does not include bands.   Percent differential counts (%) should be interpreted in the   context of the absolute cell counts (cells/L).      Lymphocytes % 01/31/2023 27.0  13.0 - 44.0 % Final    Monocytes % 01/31/2023 8.7  2.0 - 10.0 % Final    Eosinophils % 01/31/2023 1.0  0.0 - 6.0 % Final    Basophils % 01/31/2023 0.6  0.0 - 2.0 % Final    Neutrophils Absolute 01/31/2023 5.11  1.60 - 5.50 x10E9/L Final    Lymphocytes Absolute 01/31/2023 2.20  0.80 - 3.00 x10E9/L Final    Monocytes Absolute 01/31/2023 0.71  0.05 - 0.80 x10E9/L Final    Eosinophils Absolute 01/31/2023 0.08  0.00 - 0.40 x10E9/L Final    Basophils Absolute 01/31/2023 0.05  0.00 - 0.10 x10E9/L Final    Glucose 01/31/2023 80  74 - 99 mg/dL Final    Sodium 01/31/2023 133 (L)  136 - 145 mmol/L Final    Potassium 01/31/2023 4.0  3.5 - 5.3 mmol/L Final    Chloride 01/31/2023 98  98 - 107 mmol/L Final    Bicarbonate 01/31/2023 26  21 - 32 mmol/L Final    Anion Gap 01/31/2023 13  10 - 20 mmol/L Final    Urea Nitrogen 01/31/2023 19  6 - 23 mg/dL Final    Creatinine 01/31/2023 0.54  0.50 - 1.05 mg/dL Final    GFR Female 01/31/2023 >90  >90 mL/min/1.73m2 Final    Comment:  CALCULATIONS OF ESTIMATED GFR ARE PERFORMED   USING THE 2021 CKD-EPI STUDY REFIT EQUATION   WITHOUT THE RACE VARIABLE FOR THE IDMS-TRACEABLE   CREATININE  METHODS.    https://jasn.asnjournals.org/content/early/2021/09/22/ASN.9174652486      Calcium 01/31/2023 10.2  8.6 - 10.3 mg/dL Final    Albumin 01/31/2023 4.4  3.4 - 5.0 g/dL Final    Alkaline Phosphatase 01/31/2023 61  33 - 136 U/L Final    Total Protein 01/31/2023 7.1  6.4 - 8.2 g/dL Final    AST 01/31/2023 19  9 - 39 U/L Final    Total Bilirubin 01/31/2023 1.0  0.0 - 1.2 mg/dL Final    ALT (SGPT) 01/31/2023 19  7 - 45 U/L Final    Comment:  Patients treated with Sulfasalazine may generate    falsely decreased results for ALT.          Assessment/Plan     Problem List Items Addressed This Visit          Circulatory    Essential hypertension    Relevant Orders    Referral to Physical Therapy    Venous stasis       Endocrine/Metabolic    Type 2 diabetes mellitus without complication, without long-term current use of insulin (CMS/[facility])    Relevant Orders    Referral to Physical Therapy       Hematologic    Elevated platelet count       Other    Frequent falls    Relevant Orders    Referral to Physical Therapy    Encounter for Medicare annual wellness exam - Primary     Other Visit Diagnoses       Routine general medical examination at health care facility        Palpitation        Relevant Orders    ECG 12 lead    Referral to Cardiology    Abnormal EKG        Relevant Orders    Referral to Cardiology          Advance Care Planning Note   Discussion Date: 03/15/23   Discussion Participants: patient    The patient wishes to discuss Advance Care Planning today and the following is a brief summary of our discussion.     Patient has capacity to make their own medical decisions: Yes  Health Care Agent/Surrogate Decision Maker documented in chart: Yes  Documents on file and valid:  Advance Directive/Living Will: yes   Health Care Power of : yes  Communication of Medical Status/Prognosis:   yes   Communication of Treatment Goals/Options:   yes  Treatment Decisions  yes  Time Statement: Total face to face time spent  on advance care planning was <30 minutes with <30 minutes spent in counseling, including the explanation.    HOME SAFETY ADDRESSED  FULL NO CODE DESIRED  No depression /no dementia  Offered PT  Gait assistance-hasnt fallen while using cane    On vit d  BP stable  Watch diet over next few months  Agrees to cardio if abn echo  But declines if ok-bc wants conservative measures    And again had a lengthy discussion w pt  about risks of poorly controlled diabetes including micro and macrovascular complications of DM2 including blindness,MI,CVA and death among other possibilities. Pt aware and agrees to better compliance and adherance to instructions such as regular eye exams q 1-2 y, foot exams,and f/u regularly for hba1c with a goal of 6.5.    NO evidence of neuropathy or nephropathy at this point    Follow up as planned for hba1c and BP checks REGULARLY.

## 2023-03-17 ENCOUNTER — TELEPHONE (OUTPATIENT)
Dept: PRIMARY CARE | Facility: CLINIC | Age: 78
End: 2023-03-17
Payer: MEDICARE

## 2023-06-01 DIAGNOSIS — I10 ESSENTIAL HYPERTENSION: Primary | ICD-10-CM

## 2023-06-01 RX ORDER — LISINOPRIL 40 MG/1
40 TABLET ORAL DAILY
Qty: 30 TABLET | Refills: 1 | Status: SHIPPED | OUTPATIENT
Start: 2023-06-01 | End: 2023-07-27 | Stop reason: SDUPTHER

## 2023-07-05 DIAGNOSIS — E11.9 TYPE 2 DIABETES MELLITUS WITHOUT COMPLICATION, WITHOUT LONG-TERM CURRENT USE OF INSULIN (MULTI): Primary | ICD-10-CM

## 2023-07-05 DIAGNOSIS — I10 ESSENTIAL HYPERTENSION: ICD-10-CM

## 2023-07-05 RX ORDER — GLIPIZIDE 10 MG/1
10 TABLET ORAL
Qty: 30 TABLET | Refills: 1 | Status: SHIPPED | OUTPATIENT
Start: 2023-07-05 | End: 2023-08-17 | Stop reason: SDUPTHER

## 2023-07-05 RX ORDER — HYDROCHLOROTHIAZIDE 25 MG/1
25 TABLET ORAL DAILY
Qty: 30 TABLET | Refills: 0 | Status: SHIPPED | OUTPATIENT
Start: 2023-07-05 | End: 2023-07-31

## 2023-07-19 ENCOUNTER — OFFICE VISIT (OUTPATIENT)
Dept: PRIMARY CARE | Facility: CLINIC | Age: 78
End: 2023-07-19
Payer: MEDICARE

## 2023-07-19 VITALS
SYSTOLIC BLOOD PRESSURE: 126 MMHG | WEIGHT: 111 LBS | TEMPERATURE: 97.5 F | BODY MASS INDEX: 23.95 KG/M2 | HEART RATE: 90 BPM | DIASTOLIC BLOOD PRESSURE: 66 MMHG | HEIGHT: 57 IN | RESPIRATION RATE: 16 BRPM | OXYGEN SATURATION: 97 %

## 2023-07-19 DIAGNOSIS — I10 ESSENTIAL HYPERTENSION: ICD-10-CM

## 2023-07-19 DIAGNOSIS — E11.9 TYPE 2 DIABETES MELLITUS WITHOUT COMPLICATION, WITHOUT LONG-TERM CURRENT USE OF INSULIN (MULTI): ICD-10-CM

## 2023-07-19 DIAGNOSIS — G31.84 MILD COGNITIVE IMPAIRMENT: ICD-10-CM

## 2023-07-19 DIAGNOSIS — R79.89 ELEVATED PLATELET COUNT: ICD-10-CM

## 2023-07-19 LAB — POC HEMOGLOBIN A1C: 6.8 % (ref 4.2–6.5)

## 2023-07-19 PROCEDURE — 99214 OFFICE O/P EST MOD 30 MIN: CPT | Performed by: FAMILY MEDICINE

## 2023-07-19 PROCEDURE — 3074F SYST BP LT 130 MM HG: CPT | Performed by: FAMILY MEDICINE

## 2023-07-19 PROCEDURE — 1160F RVW MEDS BY RX/DR IN RCRD: CPT | Performed by: FAMILY MEDICINE

## 2023-07-19 PROCEDURE — 1036F TOBACCO NON-USER: CPT | Performed by: FAMILY MEDICINE

## 2023-07-19 PROCEDURE — 1126F AMNT PAIN NOTED NONE PRSNT: CPT | Performed by: FAMILY MEDICINE

## 2023-07-19 PROCEDURE — 83036 HEMOGLOBIN GLYCOSYLATED A1C: CPT | Performed by: FAMILY MEDICINE

## 2023-07-19 PROCEDURE — 1159F MED LIST DOCD IN RCRD: CPT | Performed by: FAMILY MEDICINE

## 2023-07-19 PROCEDURE — 3078F DIAST BP <80 MM HG: CPT | Performed by: FAMILY MEDICINE

## 2023-07-19 NOTE — PROGRESS NOTES
Chief Complaint   Patient presents with    Diabetes    Hypertension     Covid vax: x 5  CRC: 2014  Mammogram: 12/2022  Pap: hysterectomy  Lmp: hysterectomy  Mirta Sammy is here for a diabetic follow up    HBA1C=FROM 7.9 to 6.8    TES=714    Sugars most recently have been running-   HIGH & LOW <150   Activity level-     advised to increase  The patient denies history of       seizures,             heart attack or KNOWN CAD       or stroke.     No chest pain, shortness of breath, paroxysmal nocturnal dyspnea.     No nausea, vomiting, diarrhea, hematochezia or melena.      No paresthesias or headaches      No dysuria, frequency or hematuria.    Patient Active Problem List   Diagnosis    Ataxia    Atrophic vulvovaginitis    Elevated platelet count    Essential hypertension    Frequent falls    Nail fungus    Perforation of both tympanic membranes    Prolapse of female pelvic organs    Type 2 diabetes mellitus without complication, without long-term current use of insulin (CMS/[facility])    Overweight with body mass index (BMI) of 25 to 25.9 in adult    Venous stasis    Encounter for Medicare annual wellness exam    Mild cognitive impairment     Past Surgical History:   Procedure Laterality Date    APPENDECTOMY      COLONOSCOPY  2014    per pt    HYSTERECTOMY  1997     Social History     Socioeconomic History    Marital status:      Spouse name: None    Number of children: None    Years of education: None    Highest education level: None   Occupational History    None   Tobacco Use    Smoking status: Never     Passive exposure: Never    Smokeless tobacco: Never   Substance and Sexual Activity    Alcohol use: Never    Drug use: Never    Sexual activity: None   Other Topics Concern    None   Social History Narrative    None     Social Determinants of Health     Financial Resource Strain: Not on file   Food Insecurity: Not on file   Transportation Needs: Not on file   Physical Activity: Not on file   Stress: Not on file    Social Connections: Not on file   Intimate Partner Violence: Not on file   Housing Stability: Not on file     [employer] Encounter on 05/26/2023   Component Date Value Ref Range Status    WBC 05/26/2023 6.2  4.4 - 11.3 x10E9/L Final    RBC 05/26/2023 5.15  4.00 - 5.20 x10E12/L Final    Hemoglobin 05/26/2023 15.2  12.0 - 16.0 g/dL Final    Hematocrit 05/26/2023 45.8  36.0 - 46.0 % Final    MCV 05/26/2023 89  80 - 100 fL Final    MCHC 05/26/2023 33.2  32.0 - 36.0 g/dL Final    Platelets 05/26/2023 476 (H)  150 - 450 x10E9/L Final    RDW 05/26/2023 14.8 (H)  11.5 - 14.5 % Final    Neutrophils % 05/26/2023 62.1  40.0 - 80.0 % Final    Immature Granulocytes %, Automated 05/26/2023 0.2  0.0 - 0.9 % Final    Comment:  Immature Granulocyte Count (IG) includes promyelocytes,    myelocytes and metamyelocytes but does not include bands.   Percent differential counts (%) should be interpreted in the   context of the absolute cell counts (cells/L).      Lymphocytes % 05/26/2023 28.3  13.0 - 44.0 % Final    Monocytes % 05/26/2023 8.1  2.0 - 10.0 % Final    Eosinophils % 05/26/2023 0.8  0.0 - 6.0 % Final    Basophils % 05/26/2023 0.5  0.0 - 2.0 % Final    Neutrophils Absolute 05/26/2023 3.82  1.60 - 5.50 x10E9/L Final    Lymphocytes Absolute 05/26/2023 1.74  0.80 - 3.00 x10E9/L Final    Monocytes Absolute 05/26/2023 0.50  0.05 - 0.80 x10E9/L Final    Eosinophils Absolute 05/26/2023 0.05  0.00 - 0.40 x10E9/L Final    Basophils Absolute 05/26/2023 0.03  0.00 - 0.10 x10E9/L Final        Health Maintenance   Topic Date Due    COVID-19 Vaccine (6 - Booster for Moderna series) 03/02/2023    Diabetes: Retinopathy Screening  03/14/2023    Diabetes: Hemoglobin A1C  06/13/2023    Lipid Panel  07/05/2023    Influenza Vaccine (1) 09/01/2023    Diabetes: Foot Exam  03/15/2024    Diabetes: Urine Protein Screening  03/15/2024    Medicare Annual Wellness Visit (AWV)  03/16/2024    HIB Vaccines  Aged Out    Hepatitis B Vaccines  Aged Out    IPV  Vaccines  Aged Out    Hepatitis A Vaccines  Aged Out    Meningococcal Vaccine  Aged Out    Rotavirus Vaccines  Aged Out    HPV Vaccines  Aged Out    DTaP/Tdap/Td Vaccines  Discontinued    Pneumococcal Vaccine: 65+ Years  Discontinued    Zoster Vaccines  Discontinued    Hepatitis C Screening  Discontinued    Bone Density Scan  Discontinued         Wt Readings from Last 3 Encounters:   07/19/23 50.3 kg (111 lb)   04/27/23 52.2 kg (115 lb)   03/15/23 53.1 kg (117 lb)       Temp Readings from Last 3 Encounters:   07/19/23 36.4 °C (97.5 °F) (Temporal)   03/15/23 36.1 °C (97 °F) (Temporal)   12/20/22 36.2 °C (97.1 °F)     BP Readings from Last 3 Encounters:   07/19/23 126/66   04/27/23 134/71   03/15/23 120/62     Pulse Readings from Last 3 Encounters:   07/19/23 90   04/27/23 82   03/15/23 88     PHYSICAL EXAMINATION:      -----------------------------------------------------------------------------------------------------  GENERAL:  The patient appears nourished     &  normal affect.      No acute distress.     Alert and oriented times 3.     No obvious skin rashes or lesions.    No gait disturbance.      HEENT:   Normocephalic, atraumatic.    Normal speech    Extraocular eye motions intact and pain free.                 Pupils reactive and equally round. Conjunctivae clear    TMs normal    No erythema pharynx      NECK:  No masses or adenopathy palpable.  No asymmetry visible.     No thyromegaly.    No bruits.       RESPIRATORY:  Equal breath sounds / no acute respiratory distress.      No wheezes,rales, or rhonchi        HEART:  Regular rhythm without murmur, rub or gallop.       ABDOMEN:  Soft, nontender.   No masses, guarding or rebound.     Normoactive bowel sounds.       EXTREMITIES:   No edema in any extremity.           No cyanosis or clubbing.      2+ dorsalis pedis pulses bilaterally        FOOT EXAM:    A comprehensive foot exam was done no  lesions nor neuropathy    Walks w cane      1. Essential  hypertension        2. Type 2 diabetes mellitus without complication, without long-term current use of insulin (CMS/[facility])  POCT glycosylated hemoglobin (Hb A1C) manually resulted      3. Elevated platelet count        4. Mild cognitive impairment          Labs in 3-4mo  The patient is aware that results will be forthcoming of ALL planned labs and or tests. If no results are received on my chart or by letter within 1 - 3 weeks, the patient is aware they need to call to obtain results, as this is not usual. Also, if any new conditions arise, or current condition worsens, it is understood that sooner appointment should be made or urgent care/convenient care or emergency room treatment should be sought depending on severity. Otherwise follow up for recheck at regular intervals as we have discussed, at least yearly.    And again had a lengthy discussion w pt  about risks of poorly controlled diabetes including micro and macrovascular complications of DM2 including blindness,MI,CVA and death among other possibilities. Pt aware and agrees to better compliance and adherance to instructions such as regular eye exams q 1-2 y, foot exams,and f/u regularly for hba1c with a goal of 6.5.    NO evidence of neuropathy or nephropathy at this point    Follow up as planned for hba1c and BP checks REGULARLY.    Sooner if condition deteriorates or problems arise    Madeline Sweet MD

## 2023-07-19 NOTE — PATIENT INSTRUCTIONS

## 2023-07-27 DIAGNOSIS — I10 ESSENTIAL HYPERTENSION: ICD-10-CM

## 2023-07-27 RX ORDER — LISINOPRIL 40 MG/1
40 TABLET ORAL DAILY
Qty: 30 TABLET | Refills: 2 | Status: SHIPPED | OUTPATIENT
Start: 2023-07-27 | End: 2023-08-21

## 2023-07-27 RX ORDER — CARVEDILOL 12.5 MG/1
12.5 TABLET ORAL
Qty: 90 TABLET | Refills: 0 | Status: SHIPPED | OUTPATIENT
Start: 2023-07-27 | End: 2023-09-14 | Stop reason: SDUPTHER

## 2023-07-27 NOTE — TELEPHONE ENCOUNTER
Micki pt needs refills  Carvedilol 12.5mg, 1 tab twice daily  Lisinopril 40mg, 1 tab daily  [facility] [place], 90 day supply  pt's # [phone]

## 2023-07-28 ENCOUNTER — TELEPHONE (OUTPATIENT)
Dept: PRIMARY CARE | Facility: CLINIC | Age: 78
End: 2023-07-28
Payer: MEDICARE

## 2023-07-29 DIAGNOSIS — I10 ESSENTIAL HYPERTENSION: ICD-10-CM

## 2023-07-31 RX ORDER — HYDROCHLOROTHIAZIDE 25 MG/1
25 TABLET ORAL DAILY
Qty: 30 TABLET | Refills: 0 | Status: SHIPPED | OUTPATIENT
Start: 2023-07-31 | End: 2023-09-05

## 2023-08-17 DIAGNOSIS — E11.9 TYPE 2 DIABETES MELLITUS WITHOUT COMPLICATION, WITHOUT LONG-TERM CURRENT USE OF INSULIN (MULTI): ICD-10-CM

## 2023-08-17 NOTE — TELEPHONE ENCOUNTER
Patient's daughter phones the office today w/ request to renew the patient's Glipizide 10mg 1 TAB BID w/ Meals to be sent to [facility] in [place].     Thank you.

## 2023-08-18 RX ORDER — GLIPIZIDE 10 MG/1
10 TABLET ORAL
Qty: 30 TABLET | Refills: 1 | Status: SHIPPED | OUTPATIENT
Start: 2023-08-18 | End: 2023-09-14 | Stop reason: SDUPTHER

## 2023-08-20 DIAGNOSIS — I10 ESSENTIAL HYPERTENSION: ICD-10-CM

## 2023-08-21 RX ORDER — LISINOPRIL 40 MG/1
40 TABLET ORAL DAILY
Qty: 90 TABLET | Refills: 3 | Status: SHIPPED | OUTPATIENT
Start: 2023-08-21 | End: 2023-12-26

## 2023-09-03 DIAGNOSIS — I10 ESSENTIAL HYPERTENSION: ICD-10-CM

## 2023-09-05 RX ORDER — HYDROCHLOROTHIAZIDE 25 MG/1
25 TABLET ORAL DAILY
Qty: 30 TABLET | Refills: 5 | Status: SHIPPED | OUTPATIENT
Start: 2023-09-05 | End: 2023-12-26

## 2023-09-14 ENCOUNTER — TELEPHONE (OUTPATIENT)
Dept: PRIMARY CARE | Facility: CLINIC | Age: 78
End: 2023-09-14
Payer: MEDICARE

## 2023-09-14 DIAGNOSIS — E11.9 TYPE 2 DIABETES MELLITUS WITHOUT COMPLICATION, WITHOUT LONG-TERM CURRENT USE OF INSULIN (MULTI): ICD-10-CM

## 2023-09-14 DIAGNOSIS — I10 ESSENTIAL HYPERTENSION: ICD-10-CM

## 2023-09-15 RX ORDER — CARVEDILOL 12.5 MG/1
12.5 TABLET ORAL
Qty: 180 TABLET | Refills: 1 | Status: SHIPPED | OUTPATIENT
Start: 2023-09-15 | End: 2023-12-26

## 2023-09-15 RX ORDER — GLIPIZIDE 10 MG/1
10 TABLET ORAL
Qty: 180 TABLET | Refills: 1 | Status: SHIPPED | OUTPATIENT
Start: 2023-09-15 | End: 2023-12-26

## 2023-09-26 ENCOUNTER — TELEPHONE (OUTPATIENT)
Dept: PRIMARY CARE | Facility: CLINIC | Age: 78
End: 2023-09-26
Payer: MEDICARE

## 2023-09-26 NOTE — TELEPHONE ENCOUNTER
Jesenia( [employer]) called and stated patient has another small bed sore starting in her butt crack and at the very top of the crack it is red.  She stated patient was given a patch from the office to put over it to help and wondering if she can get another one.

## 2023-10-02 ENCOUNTER — CLINICAL SUPPORT (OUTPATIENT)
Dept: PRIMARY CARE | Facility: CLINIC | Age: 78
End: 2023-10-02
Payer: MEDICARE

## 2023-10-02 DIAGNOSIS — L81.9 DISCOLORATION OF SKIN: ICD-10-CM

## 2023-10-02 NOTE — PROGRESS NOTES
Pt here today with caregiver, for wound check. Caregiver states that they were putting Neosporyn and bandaids on it but the bandaids were causing irritation. Patient previously had swelling and redness mid/upper gluteal cleft, here today to see if improvement since application of duoderm. Skin is light pink without swelling or broken skin. New duoderm patch cut to size and applied. I let care giver know the doctor prefers bacitracin over neosporin. Also I let them know that if they are going to use a barrier cream, Butt Paste is preferred over Desitin. Caregiver states that it looks much much better today than even yesterday. I let them know also that if they had any questions to give us a call but all supplies discussed today can be purchased over the counter.

## 2023-10-23 ENCOUNTER — OFFICE VISIT (OUTPATIENT)
Dept: PRIMARY CARE | Facility: CLINIC | Age: 78
End: 2023-10-23
Payer: MEDICARE

## 2023-10-23 VITALS
HEART RATE: 84 BPM | DIASTOLIC BLOOD PRESSURE: 70 MMHG | SYSTOLIC BLOOD PRESSURE: 144 MMHG | RESPIRATION RATE: 16 BRPM | WEIGHT: 113.6 LBS | BODY MASS INDEX: 24.51 KG/M2 | OXYGEN SATURATION: 97 % | TEMPERATURE: 97.2 F | HEIGHT: 57 IN

## 2023-10-23 DIAGNOSIS — B34.9 VIRAL ILLNESS: Primary | ICD-10-CM

## 2023-10-23 DIAGNOSIS — Z20.822 SUSPECTED 2019 NOVEL CORONAVIRUS INFECTION: ICD-10-CM

## 2023-10-23 PROCEDURE — 87634 RSV DNA/RNA AMP PROBE: CPT

## 2023-10-23 PROCEDURE — 99213 OFFICE O/P EST LOW 20 MIN: CPT | Performed by: NURSE PRACTITIONER

## 2023-10-23 PROCEDURE — 87636 SARSCOV2 & INF A&B AMP PRB: CPT

## 2023-10-23 ASSESSMENT — ENCOUNTER SYMPTOMS
SHORTNESS OF BREATH: 0
MYALGIAS: 1
HEADACHES: 0
ABDOMINAL PAIN: 0
PALPITATIONS: 0
FEVER: 0
COUGH: 1
DIARRHEA: 0
VOMITING: 0
SINUS PAIN: 0
DIZZINESS: 0
SORE THROAT: 1
CHILLS: 0

## 2023-10-23 NOTE — PROGRESS NOTES
"Subjective   Patient ID: Mirta Christianson is a 78 y.o. female who presents for Cough.    HPI pt is in office with a cough and is achy, sore throat, body aches. Pt took tylenol and diana seltzer with no help.    78-year-old female presents today complaining of nasal congestion, cough and sore throat that started yesterday.  She denies any fever or chills.  No chest pain or trouble breathing.  She denies smoking.  No history of asthma or COPD.  She denies any ill contacts.  She has been taking Diana-[place] with little relief.  She did not do any home COVID testing.     Review of Systems   Constitutional:  Negative for chills and fever.   HENT:  Positive for congestion and sore throat. Negative for ear pain and sinus pain.    Respiratory:  Positive for cough. Negative for shortness of breath.    Cardiovascular:  Negative for chest pain and palpitations.   Gastrointestinal:  Negative for abdominal pain, diarrhea and vomiting.   Musculoskeletal:  Positive for myalgias.   Neurological:  Negative for dizziness and headaches.       Objective   /70   Pulse 84   Temp 36.2 °C (97.2 °F) (Temporal)   Resp 16   Ht 1.448 m (4' 9\")   Wt 51.5 kg (113 lb 9.6 oz)   LMP  (LMP Unknown)   SpO2 97%   BMI 24.58 kg/m²     Physical Exam  Vitals and nursing note reviewed.   Constitutional:       General: She is not in acute distress.     Appearance: Normal appearance.   HENT:      Right Ear: Tympanic membrane normal.      Left Ear: Tympanic membrane normal.      Nose: Congestion present.      Mouth/Throat:      Pharynx: Posterior oropharyngeal erythema present. No oropharyngeal exudate.   Eyes:      Conjunctiva/sclera: Conjunctivae normal.   Cardiovascular:      Rate and Rhythm: Normal rate and regular rhythm.      Heart sounds: Normal heart sounds.   Pulmonary:      Effort: Pulmonary effort is normal.      Breath sounds: Normal breath sounds. No wheezing, rhonchi or rales.   Lymphadenopathy:      Cervical: No cervical adenopathy. "   Neurological:      Mental Status: She is alert.   Psychiatric:         Mood and Affect: Mood normal.         Assessment/Plan   Problem List Items Addressed This Visit    None  Visit Diagnoses         Codes    Viral illness    -  Primary B34.9    Relevant Orders    Sars-CoV-2 PCR, Symptomatic    Influenza A, and B PCR    RSV PCR    Suspected 2019 novel coronavirus infection     Z20.822    BMI 24.0-24.9, adult     Z68.24        Viral illness: Educated that infection is most likely viral in nature and antibiotics are not indicated at this time.  Increase rest and fluids.  COVID, influenza a/B and RSV PCR tests pending.  Patient will be called with any positive results.  Follow-up with PCP in 1 week with any persisting symptoms, or sooner with any additional concerns.  Reviewed nuclear

## 2023-10-23 NOTE — PATIENT INSTRUCTIONS
Today, you were seen for Suspected Covid. A Covid test, RSV, and an influenza test has been done today.   At this point, assume you are positive until further notice. Please stay in your home until your results are released.  Practice self-quarantining, social distancing, masking and hand hygiene    COVID INSTRUCTIONS:   When to go to the ER: New onset of shortness of breath, worsening shortness of breath,  Change in mental status and/or confusion, chest pain, bluish lips or dizziness.    Disinfecting: Make sure to disinfect high touch areas frequently such as tables, doorknobs,  chairs, light switches, remotes, handles, sinks and toilets. Examples of proper disinfectants are:  any Environmental Protection Agency (EPA) registered household disinfectants, diluted bleach  solution and at least 70% alcohol based products.    If COVID test is POSITIVE:  you may return to work or other activities without restrictions in 5 days after your first symptom began AND it has been 1 full day (24 hours) that you have not had a fever.  If you are symptoms free after at least 5 days from beginning symptoms, you may return with to work with a mask worn at all times.    For more information about the Covid 19 virus, please visit:  <https://www.cdc.gov/coronavirus/2019-ncov/index.html>      Additional Information about Covid-19:  [place] department of Health: Coronavirus.[place].gov or 2-363-9AGV-ODH  Centers for Disease Control and Prevention: CDC.gov/Coronavirus/2019-nCoV      Your symptoms may be related to an unspecified viral illness such as: an URI (upper respiratory infection)  URIs are a self limiting viral syndrome, involving, to variable degrees, sneezing, nasal congestion and discharge (rhinorrhea), sore throat, cough, low grade fever, headache, and malaise.   The Incubation period (from the time of contact with infectious material until the onset of symptoms) for most common cold viruses is 24 to 72 hours. Colds usually persist  for 3 to 10 days in the normal host. Cough can persist for weeks after resolution of other signs and symptoms   Start using humidifier in your bedroom at night when sleeping. This helps with coughing and congestion. Keep well hydrated along with adequate rest and nutrition. Warm tea with honey is soothing to the throat and helps with coughing. This could also help thin the mucus, reducing the blockage in your sinuses. Elevate your head with an extra pillow while sleeping to prevent mucus from blocking your throat.   You may also do OTC Robitussin as needed for your cough  May use Tylenol per package instructions as needed for pain/fever  If symptoms do not improve, see PCP within 1 week, or sooner with any additional concerns.  If you develop worsening symptoms including shortness of breath/trouble breathing, bluish lips or chest pain, proceed to the Emergency department for further treatment

## 2023-10-24 LAB
FLUAV RNA RESP QL NAA+PROBE: NOT DETECTED
FLUBV RNA RESP QL NAA+PROBE: NOT DETECTED
RSV RNA RESP QL NAA+PROBE: NOT DETECTED
SARS-COV-2 RNA RESP QL NAA+PROBE: NOT DETECTED

## 2023-11-17 ENCOUNTER — LAB (OUTPATIENT)
Dept: LAB | Facility: CLINIC | Age: 78
End: 2023-11-17
Payer: MEDICARE

## 2023-11-17 ENCOUNTER — OFFICE VISIT (OUTPATIENT)
Dept: HEMATOLOGY/ONCOLOGY | Facility: CLINIC | Age: 78
End: 2023-11-17
Payer: MEDICARE

## 2023-11-17 VITALS
TEMPERATURE: 96.8 F | OXYGEN SATURATION: 97 % | HEART RATE: 81 BPM | BODY MASS INDEX: 23.38 KG/M2 | DIASTOLIC BLOOD PRESSURE: 71 MMHG | RESPIRATION RATE: 16 BRPM | WEIGHT: 108.03 LBS | SYSTOLIC BLOOD PRESSURE: 146 MMHG

## 2023-11-17 DIAGNOSIS — E11.9 TYPE 2 DIABETES MELLITUS WITHOUT COMPLICATION, WITHOUT LONG-TERM CURRENT USE OF INSULIN (MULTI): ICD-10-CM

## 2023-11-17 DIAGNOSIS — I10 ESSENTIAL HYPERTENSION: ICD-10-CM

## 2023-11-17 DIAGNOSIS — D47.3 ESSENTIAL (HEMORRHAGIC) THROMBOCYTHEMIA (MULTI): ICD-10-CM

## 2023-11-17 DIAGNOSIS — D47.3 ESSENTIAL THROMBOCYTHEMIA (MULTI): Primary | ICD-10-CM

## 2023-11-17 LAB
BASOPHILS # BLD AUTO: 0.03 X10*3/UL (ref 0–0.1)
BASOPHILS NFR BLD AUTO: 0.3 %
EOSINOPHIL # BLD AUTO: 0.08 X10*3/UL (ref 0–0.4)
EOSINOPHIL NFR BLD AUTO: 0.9 %
ERYTHROCYTE [DISTWIDTH] IN BLOOD BY AUTOMATED COUNT: 12.9 % (ref 11.5–14.5)
HCT VFR BLD AUTO: 41.8 % (ref 36–46)
HGB BLD-MCNC: 14 G/DL (ref 12–16)
HOLD SPECIMEN: NORMAL
IMM GRANULOCYTES # BLD AUTO: 0.01 X10*3/UL (ref 0–0.5)
IMM GRANULOCYTES NFR BLD AUTO: 0.1 % (ref 0–0.9)
LYMPHOCYTES # BLD AUTO: 1.57 X10*3/UL (ref 0.8–3)
LYMPHOCYTES NFR BLD AUTO: 18.3 %
MCH RBC QN AUTO: 30.2 PG (ref 26–34)
MCHC RBC AUTO-ENTMCNC: 33.5 G/DL (ref 32–36)
MCV RBC AUTO: 90 FL (ref 80–100)
MONOCYTES # BLD AUTO: 0.86 X10*3/UL (ref 0.05–0.8)
MONOCYTES NFR BLD AUTO: 10 %
NEUTROPHILS # BLD AUTO: 6.05 X10*3/UL (ref 1.6–5.5)
NEUTROPHILS NFR BLD AUTO: 70.4 %
PLATELET # BLD AUTO: 590 X10*3/UL (ref 150–450)
RBC # BLD AUTO: 4.63 X10*6/UL (ref 4–5.2)
WBC # BLD AUTO: 8.6 X10*3/UL (ref 4.4–11.3)

## 2023-11-17 PROCEDURE — 99214 OFFICE O/P EST MOD 30 MIN: CPT | Performed by: INTERNAL MEDICINE

## 2023-11-17 PROCEDURE — 1159F MED LIST DOCD IN RCRD: CPT | Performed by: INTERNAL MEDICINE

## 2023-11-17 PROCEDURE — 1160F RVW MEDS BY RX/DR IN RCRD: CPT | Performed by: INTERNAL MEDICINE

## 2023-11-17 PROCEDURE — 3077F SYST BP >= 140 MM HG: CPT | Performed by: INTERNAL MEDICINE

## 2023-11-17 PROCEDURE — 1036F TOBACCO NON-USER: CPT | Performed by: INTERNAL MEDICINE

## 2023-11-17 PROCEDURE — 3078F DIAST BP <80 MM HG: CPT | Performed by: INTERNAL MEDICINE

## 2023-11-17 PROCEDURE — 85025 COMPLETE CBC W/AUTO DIFF WBC: CPT

## 2023-11-17 PROCEDURE — 36415 COLL VENOUS BLD VENIPUNCTURE: CPT

## 2023-11-17 PROCEDURE — 1126F AMNT PAIN NOTED NONE PRSNT: CPT | Performed by: INTERNAL MEDICINE

## 2023-11-17 ASSESSMENT — PAIN SCALES - GENERAL: PAINLEVEL: 0-NO PAIN

## 2023-11-17 NOTE — PROGRESS NOTES
Patient ID: Mirta Christianson is a 78 y.o. female.  Referring Physician: No referring provider defined for this encounter.  Primary Care Provider: Madeline Sweet MD  Visit Type: Follow Up      Subjective    HPI  How was my platelet count?    Review of Systems   Constitutional: Negative.    HENT:  Negative.     Eyes: Negative.    Respiratory: Negative.     Cardiovascular: Negative.    Gastrointestinal: Negative.    Endocrine: Negative.    Genitourinary: Negative.     Musculoskeletal: Negative.    Skin: Negative.    Neurological: Negative.    Hematological: Negative.    Psychiatric/Behavioral: Negative.          Objective   BSA: 1.4 meters squared  /71   Pulse 81   Temp 36 °C (96.8 °F)   Resp 16   Wt 49 kg (108 lb 0.4 oz)   LMP  (LMP Unknown)   SpO2 97%   BMI 23.38 kg/m²      has a past medical history of History of mammogram (12/2022).   has a past surgical history that includes Appendectomy; Hysterectomy (1997); and Colonoscopy (2014).  Family History   Problem Relation Name Age of Onset    No Known Problems Mother       Oncology History    No history exists.       Mirta Christianson  reports that she has never smoked. She has never been exposed to tobacco smoke. She has never used smokeless tobacco.  She  reports no history of alcohol use.  She  reports no history of drug use.    Physical Exam  Vitals reviewed.   HENT:      Mouth/Throat:      Mouth: Mucous membranes are moist.   Eyes:      Extraocular Movements: Extraocular movements intact.      Pupils: Pupils are equal, round, and reactive to light.   Cardiovascular:      Rate and Rhythm: Normal rate and regular rhythm.      Heart sounds: Normal heart sounds.   Pulmonary:      Breath sounds: Normal breath sounds.   Abdominal:      General: Bowel sounds are normal.      Palpations: Abdomen is soft.   Musculoskeletal:         General: Normal range of motion.      Cervical back: Normal range of motion and neck supple.   Skin:     General: Skin is warm.  "  Neurological:      General: No focal deficit present.      Mental Status: She is alert and oriented to person, place, and time.   Psychiatric:         Mood and Affect: Mood normal.         WBC   Date/Time Value Ref Range Status   11/17/2023 01:54 PM 8.6 4.4 - 11.3 x10*3/uL Final   05/26/2023 01:24 PM 6.2 4.4 - 11.3 x10E9/L Final   02/28/2023 01:24 PM 8.0 4.4 - 11.3 x10E9/L Final   01/31/2023 01:44 PM 8.2 4.4 - 11.3 x10E9/L Final     No results found for: \"NRBC\"  RBC   Date Value Ref Range Status   11/17/2023 4.63 4.00 - 5.20 x10*6/uL Final   05/26/2023 5.15 4.00 - 5.20 x10E12/L Final   02/28/2023 4.96 4.00 - 5.20 x10E12/L Final   01/31/2023 5.06 4.00 - 5.20 x10E12/L Final     Hemoglobin   Date Value Ref Range Status   11/17/2023 14.0 12.0 - 16.0 g/dL Final   05/26/2023 15.2 12.0 - 16.0 g/dL Final   02/28/2023 14.3 12.0 - 16.0 g/dL Final   01/31/2023 14.5 12.0 - 16.0 g/dL Final     Hematocrit   Date Value Ref Range Status   11/17/2023 41.8 36.0 - 46.0 % Final   05/26/2023 45.8 36.0 - 46.0 % Final   02/28/2023 43.0 36.0 - 46.0 % Final   01/31/2023 43.5 36.0 - 46.0 % Final     MCV   Date/Time Value Ref Range Status   11/17/2023 01:54 PM 90 80 - 100 fL Final   05/26/2023 01:24 PM 89 80 - 100 fL Final   02/28/2023 01:24 PM 87 80 - 100 fL Final   01/31/2023 01:44 PM 86 80 - 100 fL Final     MCH   Date/Time Value Ref Range Status   11/17/2023 01:54 PM 30.2 26.0 - 34.0 pg Final     MCHC   Date/Time Value Ref Range Status   11/17/2023 01:54 PM 33.5 32.0 - 36.0 g/dL Final   05/26/2023 01:24 PM 33.2 32.0 - 36.0 g/dL Final   02/28/2023 01:24 PM 33.3 32.0 - 36.0 g/dL Final   01/31/2023 01:44 PM 33.3 32.0 - 36.0 g/dL Final     RDW   Date/Time Value Ref Range Status   11/17/2023 01:54 PM 12.9 11.5 - 14.5 % Final   05/26/2023 01:24 PM 14.8 (H) 11.5 - 14.5 % Final   02/28/2023 01:24 PM 14.4 11.5 - 14.5 % Final   01/31/2023 01:44 PM 14.5 11.5 - 14.5 % Final     Platelets   Date/Time Value Ref Range Status   11/17/2023 01:54  " "(H) 150 - 450 x10*3/uL Final   05/26/2023 01:24  (H) 150 - 450 x10E9/L Final   02/28/2023 01:24  (H) 150 - 450 x10E9/L Final   01/31/2023 01:44  (H) 150 - 450 x10E9/L Final     No results found for: \"MPV\"  Neutrophils %   Date/Time Value Ref Range Status   11/17/2023 01:54 PM 70.4 40.0 - 80.0 % Final   05/26/2023 01:24 PM 62.1 40.0 - 80.0 % Final   02/28/2023 01:24 PM 65.7 40.0 - 80.0 % Final   01/31/2023 01:44 PM 62.6 40.0 - 80.0 % Final     Immature Granulocytes %, Automated   Date/Time Value Ref Range Status   11/17/2023 01:54 PM 0.1 0.0 - 0.9 % Final     Comment:     Immature Granulocyte Count (IG) includes promyelocytes, myelocytes and metamyelocytes but does not include bands. Percent differential counts (%) should be interpreted in the context of the absolute cell counts (cells/UL).   05/26/2023 01:24 PM 0.2 0.0 - 0.9 % Final     Comment:      Immature Granulocyte Count (IG) includes promyelocytes,    myelocytes and metamyelocytes but does not include bands.   Percent differential counts (%) should be interpreted in the   context of the absolute cell counts (cells/L).     02/28/2023 01:24 PM 0.1 0.0 - 0.9 % Final     Comment:      Immature Granulocyte Count (IG) includes promyelocytes,    myelocytes and metamyelocytes but does not include bands.   Percent differential counts (%) should be interpreted in the   context of the absolute cell counts (cells/L).     01/31/2023 01:44 PM 0.1 0.0 - 0.9 % Final     Comment:      Immature Granulocyte Count (IG) includes promyelocytes,    myelocytes and metamyelocytes but does not include bands.   Percent differential counts (%) should be interpreted in the   context of the absolute cell counts (cells/L).       Lymphocytes %   Date/Time Value Ref Range Status   11/17/2023 01:54 PM 18.3 13.0 - 44.0 % Final   05/26/2023 01:24 PM 28.3 13.0 - 44.0 % Final   02/28/2023 01:24 PM 24.1 13.0 - 44.0 % Final   01/31/2023 01:44 PM 27.0 13.0 - 44.0 % Final "     Monocytes %   Date/Time Value Ref Range Status   11/17/2023 01:54 PM 10.0 2.0 - 10.0 % Final   05/26/2023 01:24 PM 8.1 2.0 - 10.0 % Final   02/28/2023 01:24 PM 8.6 2.0 - 10.0 % Final   01/31/2023 01:44 PM 8.7 2.0 - 10.0 % Final     Eosinophils %   Date/Time Value Ref Range Status   11/17/2023 01:54 PM 0.9 0.0 - 6.0 % Final   05/26/2023 01:24 PM 0.8 0.0 - 6.0 % Final   02/28/2023 01:24 PM 0.9 0.0 - 6.0 % Final   01/31/2023 01:44 PM 1.0 0.0 - 6.0 % Final     Basophils %   Date/Time Value Ref Range Status   11/17/2023 01:54 PM 0.3 0.0 - 2.0 % Final   05/26/2023 01:24 PM 0.5 0.0 - 2.0 % Final   02/28/2023 01:24 PM 0.6 0.0 - 2.0 % Final   01/31/2023 01:44 PM 0.6 0.0 - 2.0 % Final     Neutrophils Absolute   Date/Time Value Ref Range Status   11/17/2023 01:54 PM 6.05 (H) 1.60 - 5.50 x10*3/uL Final     Comment:     Percent differential counts (%) should be interpreted in the context of the absolute cell counts (cells/uL).   05/26/2023 01:24 PM 3.82 1.60 - 5.50 x10E9/L Final   02/28/2023 01:24 PM 5.28 1.60 - 5.50 x10E9/L Final   01/31/2023 01:44 PM 5.11 1.60 - 5.50 x10E9/L Final     Immature Granulocytes Absolute, Automated   Date/Time Value Ref Range Status   11/17/2023 01:54 PM 0.01 0.00 - 0.50 x10*3/uL Final     Lymphocytes Absolute   Date/Time Value Ref Range Status   11/17/2023 01:54 PM 1.57 0.80 - 3.00 x10*3/uL Final   05/26/2023 01:24 PM 1.74 0.80 - 3.00 x10E9/L Final   02/28/2023 01:24 PM 1.94 0.80 - 3.00 x10E9/L Final   01/31/2023 01:44 PM 2.20 0.80 - 3.00 x10E9/L Final     Monocytes Absolute   Date/Time Value Ref Range Status   11/17/2023 01:54 PM 0.86 (H) 0.05 - 0.80 x10*3/uL Final   05/26/2023 01:24 PM 0.50 0.05 - 0.80 x10E9/L Final   02/28/2023 01:24 PM 0.69 0.05 - 0.80 x10E9/L Final   01/31/2023 01:44 PM 0.71 0.05 - 0.80 x10E9/L Final     Eosinophils Absolute   Date/Time Value Ref Range Status   11/17/2023 01:54 PM 0.08 0.00 - 0.40 x10*3/uL Final   05/26/2023 01:24 PM 0.05 0.00 - 0.40 x10E9/L Final  "  02/28/2023 01:24 PM 0.07 0.00 - 0.40 x10E9/L Final   01/31/2023 01:44 PM 0.08 0.00 - 0.40 x10E9/L Final     Basophils Absolute   Date/Time Value Ref Range Status   11/17/2023 01:54 PM 0.03 0.00 - 0.10 x10*3/uL Final   05/26/2023 01:24 PM 0.03 0.00 - 0.10 x10E9/L Final   02/28/2023 01:24 PM 0.05 0.00 - 0.10 x10E9/L Final   01/31/2023 01:44 PM 0.05 0.00 - 0.10 x10E9/L Final       No components found for: \"PT\"  No results found for: \"APTT\"    Assessment/Plan    1) essential thrombocythemia  -has JAK2+ MPN/ET confirmed via bone marrow biopsy  -continues to take daily baby ASA  -today we checked CBC + COMP  -results reviewed-wbc 8.6, hgb 14, plt 590.000  -would need to start cytoreductive therapy only if plt count >750+  -will see her again in 6 months       2. DM  -on trulicity  -on glipizide  -on metformin     3. Hypertension   -on amlodipine  -on coreg  -on lisinopril  -on hydrochlorothiazide        Problem List Items Addressed This Visit    None  Visit Diagnoses         Codes    Essential thrombocythemia (CMS/HCC)    -  Primary D47.3    Relevant Orders    Clinic Appointment Request Follow Up; [facility]; [id number]    CBC and Auto Differential    Comprehensive metabolic panel                 Rocco Shaw MD                         "

## 2023-11-17 NOTE — PATIENT INSTRUCTIONS
There is no need to add any extra medication unless your platelet count exceeds 750,000    See you again in 6 months

## 2023-11-19 ASSESSMENT — ENCOUNTER SYMPTOMS
RESPIRATORY NEGATIVE: 1
NEUROLOGICAL NEGATIVE: 1
ENDOCRINE NEGATIVE: 1
MUSCULOSKELETAL NEGATIVE: 1
GASTROINTESTINAL NEGATIVE: 1
HEMATOLOGIC/LYMPHATIC NEGATIVE: 1
EYES NEGATIVE: 1
CONSTITUTIONAL NEGATIVE: 1
CARDIOVASCULAR NEGATIVE: 1
PSYCHIATRIC NEGATIVE: 1

## 2023-11-20 ENCOUNTER — ANCILLARY PROCEDURE (OUTPATIENT)
Dept: RADIOLOGY | Facility: CLINIC | Age: 78
End: 2023-11-20
Payer: MEDICARE

## 2023-11-20 ENCOUNTER — LAB (OUTPATIENT)
Dept: LAB | Facility: LAB | Age: 78
End: 2023-11-20
Payer: MEDICARE

## 2023-11-20 ENCOUNTER — OFFICE VISIT (OUTPATIENT)
Dept: PRIMARY CARE | Facility: CLINIC | Age: 78
End: 2023-11-20
Payer: MEDICARE

## 2023-11-20 VITALS
SYSTOLIC BLOOD PRESSURE: 128 MMHG | OXYGEN SATURATION: 98 % | HEART RATE: 92 BPM | WEIGHT: 111 LBS | RESPIRATION RATE: 16 BRPM | TEMPERATURE: 97.4 F | DIASTOLIC BLOOD PRESSURE: 64 MMHG | HEIGHT: 57 IN | BODY MASS INDEX: 23.95 KG/M2

## 2023-11-20 DIAGNOSIS — J21.9 ACUTE BRONCHIOLITIS DUE TO UNSPECIFIED ORGANISM: ICD-10-CM

## 2023-11-20 DIAGNOSIS — Z12.31 ENCOUNTER FOR SCREENING MAMMOGRAM FOR MALIGNANT NEOPLASM OF BREAST: ICD-10-CM

## 2023-11-20 DIAGNOSIS — E11.9 TYPE 2 DIABETES MELLITUS WITHOUT COMPLICATION, WITHOUT LONG-TERM CURRENT USE OF INSULIN (MULTI): ICD-10-CM

## 2023-11-20 DIAGNOSIS — G89.29 CHRONIC BILATERAL LOW BACK PAIN WITHOUT SCIATICA: ICD-10-CM

## 2023-11-20 DIAGNOSIS — M54.50 CHRONIC BILATERAL LOW BACK PAIN WITHOUT SCIATICA: ICD-10-CM

## 2023-11-20 DIAGNOSIS — R05.1 ACUTE COUGH: ICD-10-CM

## 2023-11-20 DIAGNOSIS — M25.552 LEFT HIP PAIN: ICD-10-CM

## 2023-11-20 DIAGNOSIS — R79.89 ELEVATED PLATELET COUNT: ICD-10-CM

## 2023-11-20 DIAGNOSIS — G31.84 MILD COGNITIVE IMPAIRMENT: ICD-10-CM

## 2023-11-20 DIAGNOSIS — Z23 NEED FOR VACCINATION: ICD-10-CM

## 2023-11-20 DIAGNOSIS — I10 ESSENTIAL HYPERTENSION: ICD-10-CM

## 2023-11-20 DIAGNOSIS — I87.8 VENOUS STASIS: ICD-10-CM

## 2023-11-20 DIAGNOSIS — G31.9 DEGENERATIVE DISEASE OF NERVOUS SYSTEM, UNSPECIFIED (CMS-HCC): Primary | ICD-10-CM

## 2023-11-20 LAB
ALBUMIN SERPL BCP-MCNC: 4.2 G/DL (ref 3.4–5)
ALP SERPL-CCNC: 83 U/L (ref 33–136)
ALT SERPL W P-5'-P-CCNC: 17 U/L (ref 7–45)
ANION GAP SERPL CALC-SCNC: 15 MMOL/L (ref 10–20)
AST SERPL W P-5'-P-CCNC: 17 U/L (ref 9–39)
BASOPHILS # BLD AUTO: 0.05 X10*3/UL (ref 0–0.1)
BASOPHILS NFR BLD AUTO: 0.6 %
BILIRUB SERPL-MCNC: 0.6 MG/DL (ref 0–1.2)
BUN SERPL-MCNC: 14 MG/DL (ref 6–23)
CALCIUM SERPL-MCNC: 9.6 MG/DL (ref 8.6–10.3)
CHLORIDE SERPL-SCNC: 95 MMOL/L (ref 98–107)
CHOLEST SERPL-MCNC: 168 MG/DL (ref 0–199)
CHOLESTEROL/HDL RATIO: 3.3
CO2 SERPL-SCNC: 26 MMOL/L (ref 21–32)
CREAT SERPL-MCNC: 0.59 MG/DL (ref 0.5–1.05)
EOSINOPHIL # BLD AUTO: 0.06 X10*3/UL (ref 0–0.4)
EOSINOPHIL NFR BLD AUTO: 0.7 %
ERYTHROCYTE [DISTWIDTH] IN BLOOD BY AUTOMATED COUNT: 13.2 % (ref 11.5–14.5)
EST. AVERAGE GLUCOSE BLD GHB EST-MCNC: 148 MG/DL
GFR SERPL CREATININE-BSD FRML MDRD: >90 ML/MIN/1.73M*2
GLUCOSE SERPL-MCNC: 136 MG/DL (ref 74–99)
HBA1C MFR BLD: 6.8 %
HCT VFR BLD AUTO: 42.5 % (ref 36–46)
HDLC SERPL-MCNC: 50.2 MG/DL
HGB BLD-MCNC: 14 G/DL (ref 12–16)
IMM GRANULOCYTES # BLD AUTO: 0.04 X10*3/UL (ref 0–0.5)
IMM GRANULOCYTES NFR BLD AUTO: 0.5 % (ref 0–0.9)
LDLC SERPL CALC-MCNC: 101 MG/DL
LYMPHOCYTES # BLD AUTO: 1.38 X10*3/UL (ref 0.8–3)
LYMPHOCYTES NFR BLD AUTO: 16.3 %
MCH RBC QN AUTO: 30 PG (ref 26–34)
MCHC RBC AUTO-ENTMCNC: 32.9 G/DL (ref 32–36)
MCV RBC AUTO: 91 FL (ref 80–100)
MONOCYTES # BLD AUTO: 1.01 X10*3/UL (ref 0.05–0.8)
MONOCYTES NFR BLD AUTO: 11.9 %
NEUTROPHILS # BLD AUTO: 5.94 X10*3/UL (ref 1.6–5.5)
NEUTROPHILS NFR BLD AUTO: 70 %
NON HDL CHOLESTEROL: 118 MG/DL (ref 0–149)
NRBC BLD-RTO: 0 /100 WBCS (ref 0–0)
PLATELET # BLD AUTO: 571 X10*3/UL (ref 150–450)
POTASSIUM SERPL-SCNC: 4.1 MMOL/L (ref 3.5–5.3)
PROT SERPL-MCNC: 7.5 G/DL (ref 6.4–8.2)
RBC # BLD AUTO: 4.67 X10*6/UL (ref 4–5.2)
SODIUM SERPL-SCNC: 132 MMOL/L (ref 136–145)
TRIGL SERPL-MCNC: 84 MG/DL (ref 0–149)
VLDL: 17 MG/DL (ref 0–40)
WBC # BLD AUTO: 8.5 X10*3/UL (ref 4.4–11.3)

## 2023-11-20 PROCEDURE — 3078F DIAST BP <80 MM HG: CPT | Performed by: FAMILY MEDICINE

## 2023-11-20 PROCEDURE — 83036 HEMOGLOBIN GLYCOSYLATED A1C: CPT

## 2023-11-20 PROCEDURE — 87075 CULTR BACTERIA EXCEPT BLOOD: CPT

## 2023-11-20 PROCEDURE — 87205 SMEAR GRAM STAIN: CPT

## 2023-11-20 PROCEDURE — 80053 COMPREHEN METABOLIC PANEL: CPT

## 2023-11-20 PROCEDURE — 87070 CULTURE OTHR SPECIMN AEROBIC: CPT

## 2023-11-20 PROCEDURE — 1160F RVW MEDS BY RX/DR IN RCRD: CPT | Performed by: FAMILY MEDICINE

## 2023-11-20 PROCEDURE — 3074F SYST BP LT 130 MM HG: CPT | Performed by: FAMILY MEDICINE

## 2023-11-20 PROCEDURE — 73502 X-RAY EXAM HIP UNI 2-3 VIEWS: CPT | Mod: LEFT SIDE | Performed by: RADIOLOGY

## 2023-11-20 PROCEDURE — 71046 X-RAY EXAM CHEST 2 VIEWS: CPT | Performed by: RADIOLOGY

## 2023-11-20 PROCEDURE — 80061 LIPID PANEL: CPT

## 2023-11-20 PROCEDURE — 73502 X-RAY EXAM HIP UNI 2-3 VIEWS: CPT | Mod: LT,FY

## 2023-11-20 PROCEDURE — 36415 COLL VENOUS BLD VENIPUNCTURE: CPT

## 2023-11-20 PROCEDURE — 1126F AMNT PAIN NOTED NONE PRSNT: CPT | Performed by: FAMILY MEDICINE

## 2023-11-20 PROCEDURE — 90662 IIV NO PRSV INCREASED AG IM: CPT | Performed by: FAMILY MEDICINE

## 2023-11-20 PROCEDURE — 85025 COMPLETE CBC W/AUTO DIFF WBC: CPT

## 2023-11-20 PROCEDURE — 72100 X-RAY EXAM L-S SPINE 2/3 VWS: CPT | Performed by: RADIOLOGY

## 2023-11-20 PROCEDURE — G0008 ADMIN INFLUENZA VIRUS VAC: HCPCS | Performed by: FAMILY MEDICINE

## 2023-11-20 PROCEDURE — 1036F TOBACCO NON-USER: CPT | Performed by: FAMILY MEDICINE

## 2023-11-20 PROCEDURE — 71046 X-RAY EXAM CHEST 2 VIEWS: CPT | Mod: FY

## 2023-11-20 PROCEDURE — 99214 OFFICE O/P EST MOD 30 MIN: CPT | Performed by: FAMILY MEDICINE

## 2023-11-20 PROCEDURE — 1159F MED LIST DOCD IN RCRD: CPT | Performed by: FAMILY MEDICINE

## 2023-11-20 PROCEDURE — 72100 X-RAY EXAM L-S SPINE 2/3 VWS: CPT

## 2023-11-20 RX ORDER — AMOXICILLIN AND CLAVULANATE POTASSIUM 875; 125 MG/1; MG/1
875 TABLET, FILM COATED ORAL 2 TIMES DAILY
Qty: 20 TABLET | Refills: 0 | Status: SHIPPED | OUTPATIENT
Start: 2023-11-20 | End: 2023-12-04 | Stop reason: ALTCHOICE

## 2023-11-20 NOTE — PROGRESS NOTES
"Subjective   Patient ID: Mirta Christianson is a 78 y.o. female who presents for Diabetes, Hypertension, Sore (On buttock-has been using bacitracin ), Fall (Back pain and left hip pain), and Cough ([facility], flu, rsv negative).   ldl 101  Hba1c was 6.9  Covid vax: x 5  Flu: UTD  Pneumo: declined  RSV: advised  Shingles: advised     CRC: 2014 per pt  Mammogram: 12/2022-ordered  Pap: n/a  Lmp: n/a  Fell onto but  Declines {PT  Fell twice mild onto butt and L hip pain-at times-not NOW  HPI  Patient Active Problem List   Diagnosis    Ataxia    Atrophic vulvovaginitis    Elevated platelet count    Essential hypertension    Frequent falls    Nail fungus    Perforation of both tympanic membranes    Prolapse of female pelvic organs    Type 2 diabetes mellitus without complication, without long-term current use of insulin (CMS/formerly [facility])    Overweight with body mass index (BMI) of 25 to 25.9 in adult    Venous stasis    Encounter for Medicare annual wellness exam    Mild cognitive impairment    Degenerative disease of nervous system, unspecified (CMS/formerly [facility])       Past Surgical History:   Procedure Laterality Date    APPENDECTOMY      COLONOSCOPY  2014    per pt    HYSTERECTOMY  1997       Review of Systems no sz mi or cva    This patient has   NO history of recent Covid nor flu symptoms,  NO Fever nor chills,  NO Chest pain, shortness of breath nor paroxysmal nocturnal dyspnea,  NO Nausea, vomiting, nor diarrhea,  NO Hematochezia nor melena,  NO Dysuria, hematuria, nor new incontinence issues  NO new severe headaches nor neurological complaints,  NO new issues with anxiety nor depression nor new psychiatric complaints,  NO suicidal nor homicidal ideations.     OBJECTIVE:  /64   Pulse 92   Temp 36.3 °C (97.4 °F) (Temporal)   Resp 16   Ht 1.448 m (4' 9\")   Wt 50.3 kg (111 lb)   LMP  (LMP Unknown)   SpO2 98%   BMI 24.02 kg/m²      General:  alert, oriented, no acute distress.  Does not appear acutely ill or " toxic    No obvious skin rashes noted.   No gait disturbance noted.    Mood is pleasant, not tearful, no signs of emotional distress.  Not appearing intoxicated or altered.   No voiced delusions,   Normal, appropriate behavior.    HEENT: Normocephalic, atraumatic,   Pupils round, reactive to light  Extraocular motions intact and wnl  Tympanic membranes normal    Neck: no nuchal rigidity  No masses palpable.  No carotid bruits.  No thyromegaly.    Respiratory: Equal breath sounds  No wheezes,  scattered   rales,    scant rhonchi  No respiratory distress.    Heart: Regular rate and rhythm, no    murmurs  no rubs/gallops    Abdomen: no masses palpable, nontender, no rebound nor guarding.    Extremities: NO cyanosis noted, no clubbing.   No edema noted.  2+dorsalis pedis pulses.    Normal-not antalgic, steady gait. W cane  Denies foot lesions  Stage q decubitus sl red 1cm healing    Lab on 11/20/2023   Component Date Value Ref Range Status    WBC 11/20/2023 8.5  4.4 - 11.3 x10*3/uL Final    nRBC 11/20/2023 0.0  0.0 - 0.0 /100 WBCs Final    RBC 11/20/2023 4.67  4.00 - 5.20 x10*6/uL Final    Hemoglobin 11/20/2023 14.0  12.0 - 16.0 g/dL Final    Hematocrit 11/20/2023 42.5  36.0 - 46.0 % Final    MCV 11/20/2023 91  80 - 100 fL Final    MCH 11/20/2023 30.0  26.0 - 34.0 pg Final    MCHC 11/20/2023 32.9  32.0 - 36.0 g/dL Final    RDW 11/20/2023 13.2  11.5 - 14.5 % Final    Platelets 11/20/2023 571 (H)  150 - 450 x10*3/uL Final    Neutrophils % 11/20/2023 70.0  40.0 - 80.0 % Final    Immature Granulocytes %, Automated 11/20/2023 0.5  0.0 - 0.9 % Final    Immature Granulocyte Count (IG) includes promyelocytes, myelocytes and metamyelocytes but does not include bands. Percent differential counts (%) should be interpreted in the context of the absolute cell counts (cells/UL).    Lymphocytes % 11/20/2023 16.3  13.0 - 44.0 % Final    Monocytes % 11/20/2023 11.9  2.0 - 10.0 % Final    Eosinophils % 11/20/2023 0.7  0.0 - 6.0 % Final     Basophils % 11/20/2023 0.6  0.0 - 2.0 % Final    Neutrophils Absolute 11/20/2023 5.94 (H)  1.60 - 5.50 x10*3/uL Final    Percent differential counts (%) should be interpreted in the context of the absolute cell counts (cells/uL).    Immature Granulocytes Absolute, Au* 11/20/2023 0.04  0.00 - 0.50 x10*3/uL Final    Lymphocytes Absolute 11/20/2023 1.38  0.80 - 3.00 x10*3/uL Final    Monocytes Absolute 11/20/2023 1.01 (H)  0.05 - 0.80 x10*3/uL Final    Eosinophils Absolute 11/20/2023 0.06  0.00 - 0.40 x10*3/uL Final    Basophils Absolute 11/20/2023 0.05  0.00 - 0.10 x10*3/uL Final    Glucose 11/20/2023 136 (H)  74 - 99 mg/dL Final    Sodium 11/20/2023 132 (L)  136 - 145 mmol/L Final    Potassium 11/20/2023 4.1  3.5 - 5.3 mmol/L Final    Chloride 11/20/2023 95 (L)  98 - 107 mmol/L Final    Bicarbonate 11/20/2023 26  21 - 32 mmol/L Final    Anion Gap 11/20/2023 15  10 - 20 mmol/L Final    Urea Nitrogen 11/20/2023 14  6 - 23 mg/dL Final    Creatinine 11/20/2023 0.59  0.50 - 1.05 mg/dL Final    eGFR 11/20/2023 >90  >60 mL/min/1.73m*2 Final    Calculations of estimated GFR are performed using the 2021 CKD-EPI Study Refit equation without the race variable for the IDMS-Traceable creatinine methods.  https://jasn.asnjournals.org/content/early/2021/09/22/ASN.9301847805    Calcium 11/20/2023 9.6  8.6 - 10.3 mg/dL Final    Albumin 11/20/2023 4.2  3.4 - 5.0 g/dL Final    Alkaline Phosphatase 11/20/2023 83  33 - 136 U/L Final    Total Protein 11/20/2023 7.5  6.4 - 8.2 g/dL Final    AST 11/20/2023 17  9 - 39 U/L Final    Bilirubin, Total 11/20/2023 0.6  0.0 - 1.2 mg/dL Final    ALT 11/20/2023 17  7 - 45 U/L Final    Patients treated with Sulfasalazine may generate falsely decreased results for ALT.    Cholesterol 11/20/2023 168  0 - 199 mg/dL Final          Age      Desirable   Borderline High   High     0-19 Y     0 - 169       170 - 199     >/= 200    20-24 Y     0 - 189       190 - 224     >/= 225         >24 Y     0 - 199        200 - 239     >/= 240   **All ranges are based on fasting samples. Specific   therapeutic targets will vary based on patient-specific   cardiac risk.    Pediatric guidelines reference:Pediatrics 2011, 128(S5).Adult guidelines reference: NCEP ATPIII Guidelines,CHRISTOPH 2001, 258:2486-97    Venipuncture immediately after or during the administration of Metamizole may lead to falsely low results. Testing should be performed immediately prior to Metamizole dosing.    HDL-Cholesterol 11/20/2023 50.2  mg/dL Final      Age       Very Low   Low     Normal    High    0-19 Y    < 35      < 40     40-45     ----  20-24 Y    ----     < 40      >45      ----        >24 Y      ----     < 40     40-60      >60      Cholesterol/HDL Ratio 11/20/2023 3.3   Final      Ref Values  Desirable  < 3.4  High Risk  > 5.0    LDL Calculated 11/20/2023 101 (H)  <=99 mg/dL Final                                Near   Borderline      AGE      Desirable  Optimal    High     High     Very High     0-19 Y     0 - 109     ---    110-129   >/= 130     ----    20-24 Y     0 - 119     ---    120-159   >/= 160     ----      >24 Y     0 -  99   100-129  130-159   160-189     >/=190      VLDL 11/20/2023 17  0 - 40 mg/dL Final    Triglycerides 11/20/2023 84  0 - 149 mg/dL Final       Age         Desirable   Borderline High   High     Very High   0 D-90 D    19 - 174         ----         ----        ----  91 D- 9 Y     0 -  74        75 -  99     >/= 100      ----    10-19 Y     0 -  89        90 - 129     >/= 130      ----    20-24 Y     0 - 114       115 - 149     >/= 150      ----         >24 Y     0 - 149       150 - 199    200- 499    >/= 500    Venipuncture immediately after or during the administration of Metamizole may lead to falsely low results. Testing should be performed immediately prior to Metamizole dosing.    Non HDL Cholesterol 11/20/2023 118  0 - 149 mg/dL Final          Age       Desirable   Borderline High   High     Very High     0-19 Y      0 - 119       120 - 144     >/= 145    >/= 160    20-24 Y     0 - 149       150 - 189     >/= 190      ----         >24 Y    30 mg/dL above LDL Cholesterol goal     Lab on 11/17/2023   Component Date Value Ref Range Status    WBC 11/17/2023 8.6  4.4 - 11.3 x10*3/uL Final    RBC 11/17/2023 4.63  4.00 - 5.20 x10*6/uL Final    Hemoglobin 11/17/2023 14.0  12.0 - 16.0 g/dL Final    Hematocrit 11/17/2023 41.8  36.0 - 46.0 % Final    MCV 11/17/2023 90  80 - 100 fL Final    MCH 11/17/2023 30.2  26.0 - 34.0 pg Final    MCHC 11/17/2023 33.5  32.0 - 36.0 g/dL Final    RDW 11/17/2023 12.9  11.5 - 14.5 % Final    Platelets 11/17/2023 590 (H)  150 - 450 x10*3/uL Final    Neutrophils % 11/17/2023 70.4  40.0 - 80.0 % Final    Immature Granulocytes %, Automated 11/17/2023 0.1  0.0 - 0.9 % Final    Immature Granulocyte Count (IG) includes promyelocytes, myelocytes and metamyelocytes but does not include bands. Percent differential counts (%) should be interpreted in the context of the absolute cell counts (cells/UL).    Lymphocytes % 11/17/2023 18.3  13.0 - 44.0 % Final    Monocytes % 11/17/2023 10.0  2.0 - 10.0 % Final    Eosinophils % 11/17/2023 0.9  0.0 - 6.0 % Final    Basophils % 11/17/2023 0.3  0.0 - 2.0 % Final    Neutrophils Absolute 11/17/2023 6.05 (H)  1.60 - 5.50 x10*3/uL Final    Percent differential counts (%) should be interpreted in the context of the absolute cell counts (cells/uL).    Immature Granulocytes Absolute, Au* 11/17/2023 0.01  0.00 - 0.50 x10*3/uL Final    Lymphocytes Absolute 11/17/2023 1.57  0.80 - 3.00 x10*3/uL Final    Monocytes Absolute 11/17/2023 0.86 (H)  0.05 - 0.80 x10*3/uL Final    Eosinophils Absolute 11/17/2023 0.08  0.00 - 0.40 x10*3/uL Final    Basophils Absolute 11/17/2023 0.03  0.00 - 0.10 x10*3/uL Final    Extra Tube 11/17/2023 Hold for add-ons.   Final    Auto resulted.   Office Visit on 10/23/2023   Component Date Value Ref Range Status    Coronavirus 2019, PCR 10/23/2023 Not Detected   Not Detected Final    RSV PCR 10/23/2023 Not Detected  Not Detected Final    Flu A Result 10/23/2023 Not Detected  Not Detected Final    Flu B Result 10/23/2023 Not Detected  Not Detected Final        Assessment/Plan     Problem List Items Addressed This Visit       Elevated platelet count    Essential hypertension    Type 2 diabetes mellitus without complication, without long-term current use of insulin (CMS/[facility])    Venous stasis    Mild cognitive impairment    Degenerative disease of nervous system, unspecified (CMS/[facility]) - Primary     Other Visit Diagnoses       Need for vaccination        Relevant Orders    Flu vaccine, quadrivalent, high-dose, preservative free, age 65y+ (FLUZONE) (Completed)    Encounter for screening mammogram for malignant neoplasm of breast        Relevant Orders    BI mammo bilateral screening tomosynthesis        Htn-controlled   Sugars-controlled but hba1c pends  Sees hemeonc  Declines scope  See me 4mo  Some hip pain w rom   See me if persists or agrees to PT      And again had a lengthy discussion w pt  about risks of poorly controlled diabetes including micro and macrovascular complications of DM2 including blindness,MI,CVA and death among other possibilities. Pt aware and agrees to better compliance and adherance to instructions such as regular eye exams q 1-2 y, foot exams,and f/u regularly for hba1c with a goal of 6.5.    NO evidence of neuropathy or nephropathy at this point    Follow up as planned for hba1c and BP checks REGULARLY.  Hba1c pends  Care for ulcer addressed  Augmentin This medications risks, benefits, and alternatives were discussed with patient at length.  If any unwanted side effects occur-discontinue medicine and call the office for discussion.  The risks, benefits, and alternatives for the antibiotic prescribed were discussed with patient as well as possible side effects. If ANY signs or symptoms of allergic reaction patient is to discontinue medicine immediately  and call us or GO TO ER if tongue swelling/respiratory issues or significant effects.  It has already been determined that the benefits outweigh the risks of an antibiotic for you at this time, unless otherwise indicated.  Antibiotics are usually meant to be taken to completion as advised and it is usual course for symptoms to IMPROVE while taking-if symptoms worsen or new problems arise, we should be notified or medical evaluation should occur. Persistent side effects such as diarrhea especially after antibiotic discontinuation are unusual, and should prompt assessment and evaluation either in office or ER depending on severity. Rash, and/or other symptoms of concern should also cause evaluation.  Complete resolution of original symptoms are expected and we should be informed if this does not occur.  2wks recheck  To ER if worse

## 2023-11-20 NOTE — PROGRESS NOTES
Covid vax: x 5  Flu: UTD  Pneumo: declined  RSV: advised  Shingles: advised    CRC: 2014 per pt  Mammogram: 12/2022-ordered  Pap: n/a  Lmp: n/a

## 2023-11-21 DIAGNOSIS — E11.9 TYPE 2 DIABETES MELLITUS WITHOUT COMPLICATION, WITHOUT LONG-TERM CURRENT USE OF INSULIN (MULTI): Primary | ICD-10-CM

## 2023-11-21 DIAGNOSIS — J40 BRONCHITIS: Primary | ICD-10-CM

## 2023-11-21 RX ORDER — CODEINE PHOSPHATE AND GUAIFENESIN 10; 100 MG/5ML; MG/5ML
5 SOLUTION ORAL EVERY 6 HOURS PRN
Qty: 250 ML | Refills: 0 | Status: SHIPPED | OUTPATIENT
Start: 2023-11-21 | End: 2023-12-04 | Stop reason: ALTCHOICE

## 2023-11-22 LAB
BACTERIA SPEC RESP CULT: NORMAL
GRAM STN SPEC: NORMAL
GRAM STN SPEC: NORMAL

## 2023-11-25 ENCOUNTER — HOSPITAL ENCOUNTER (EMERGENCY)
Age: 78
Discharge: HOME | End: 2023-11-25
Attending: EMERGENCY MEDICINE
Payer: MEDICARE

## 2023-11-25 ENCOUNTER — APPOINTMENT (OUTPATIENT)
Dept: CT IMAGING | Age: 78
End: 2023-11-25
Payer: MEDICARE

## 2023-11-25 VITALS
HEART RATE: 85 BPM | BODY MASS INDEX: 23.95 KG/M2 | TEMPERATURE: 98.1 F | SYSTOLIC BLOOD PRESSURE: 130 MMHG | OXYGEN SATURATION: 96 % | RESPIRATION RATE: 19 BRPM | DIASTOLIC BLOOD PRESSURE: 64 MMHG | WEIGHT: 111 LBS | HEIGHT: 57 IN

## 2023-11-25 DIAGNOSIS — E86.0 DEHYDRATION: ICD-10-CM
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
## 2023-11-25 LAB
ALBUMIN SERPL-MCNC: 4 G/DL (ref 3.5–4.6)
ALP SERPL-CCNC: 86 U/L (ref 40–130)
ALT SERPL-CCNC: 29 U/L (ref 0–33)
ANION GAP SERPL CALCULATED.3IONS-SCNC: 11 MEQ/L (ref 9–15)
ANISOCYTOSIS BLD QL SMEAR: ABNORMAL
AST SERPL-CCNC: 49 U/L (ref 0–35)
BACTERIA URNS QL MICRO: NEGATIVE /HPF
BASOPHILS # BLD: 0.2 K/UL (ref 0–0.2)
BASOPHILS NFR BLD: 1 %
BILIRUB SERPL-MCNC: 0.9 MG/DL (ref 0.2–0.7)
BILIRUB UR QL STRIP: NEGATIVE
BUN SERPL-MCNC: 33 MG/DL (ref 8–23)
CALCIUM SERPL-MCNC: 9.8 MG/DL (ref 8.5–9.9)
CHLORIDE SERPL-SCNC: 103 MEQ/L (ref 95–107)
CLARITY UR: CLEAR
CO2 SERPL-SCNC: 25 MEQ/L (ref 20–31)
COLOR UR: YELLOW
CREAT SERPL-MCNC: 0.67 MG/DL (ref 0.5–0.9)
EOSINOPHIL # BLD: 0 K/UL (ref 0–0.7)
EOSINOPHIL NFR BLD: 0 %
EPI CELLS #/AREA URNS AUTO: NORMAL /HPF (ref 0–5)
ERYTHROCYTE [DISTWIDTH] IN BLOOD BY AUTOMATED COUNT: 13 % (ref 11.5–14.5)
GLOBULIN SER CALC-MCNC: 3.1 G/DL (ref 2.3–3.5)
GLUCOSE SERPL-MCNC: 232 MG/DL (ref 70–99)
GLUCOSE UR STRIP-MCNC: >=1000 MG/DL
HCT VFR BLD AUTO: 44.4 % (ref 37–47)
HGB BLD-MCNC: 14.6 G/DL (ref 12–16)
HGB UR QL STRIP: ABNORMAL
HYALINE CASTS #/AREA URNS AUTO: NORMAL /HPF (ref 0–5)
KETONES UR STRIP-MCNC: ABNORMAL MG/DL
LEUKOCYTE ESTERASE UR QL STRIP: NEGATIVE
LYMPHOCYTES # BLD: 0.5 K/UL (ref 1–4.8)
LYMPHOCYTES NFR BLD: 1 %
MCH RBC QN AUTO: 29.9 PG (ref 27–31.3)
MCHC RBC AUTO-ENTMCNC: 32.9 % (ref 33–37)
MCV RBC AUTO: 90.8 FL (ref 79.4–94.8)
MONOCYTES # BLD: 2 K/UL (ref 0.2–0.8)
MONOCYTES NFR BLD: 11.4 %
NEUTROPHILS # BLD: 15.2 K/UL (ref 1.4–6.5)
NEUTS SEG NFR BLD: 85 %
NITRITE UR QL STRIP: NEGATIVE
PH UR STRIP: 6 [PH] (ref 5–9)
PLATELET # BLD AUTO: 590 K/UL (ref 130–400)
PLATELET BLD QL SMEAR: ABNORMAL
POTASSIUM SERPL-SCNC: 4.5 MEQ/L (ref 3.4–4.9)
PROT SERPL-MCNC: 7.1 G/DL (ref 6.3–8)
PROT UR STRIP-MCNC: 30 MG/DL
RBC # BLD AUTO: 4.89 M/UL (ref 4.2–5.4)
RBC #/AREA URNS AUTO: NORMAL /HPF (ref 0–5)
SLIDE REVIEW: ABNORMAL
SODIUM SERPL-SCNC: 139 MEQ/L (ref 135–144)
SP GR UR STRIP: 1.03 (ref 1–1.03)
URINE REFLEX TO CULTURE: ABNORMAL
UROBILINOGEN UR STRIP-ACNC: 0.2 E.U./DL
VARIANT LYMPHS NFR BLD: 2 %
WBC # BLD AUTO: 17.9 K/UL (ref 4.8–10.8)
WBC #/AREA URNS AUTO: NORMAL /HPF (ref 0–5)

## 2023-11-25 PROCEDURE — 36415 COLL VENOUS BLD VENIPUNCTURE: CPT

## 2023-11-25 PROCEDURE — 96360 HYDRATION IV INFUSION INIT: CPT

## 2023-11-25 PROCEDURE — 81001 URINALYSIS AUTO W/SCOPE: CPT

## 2023-11-25 PROCEDURE — 70450 CT HEAD/BRAIN W/O DYE: CPT

## 2023-11-25 PROCEDURE — 99285 EMERGENCY DEPT VISIT HI MDM: CPT

## 2023-11-25 PROCEDURE — 80053 COMPREHEN METABOLIC PANEL: CPT

## 2023-11-25 PROCEDURE — 85025 COMPLETE CBC W/AUTO DIFF WBC: CPT

## 2023-11-25 PROCEDURE — 2580000003 HC RX 258: Performed by: EMERGENCY MEDICINE

## 2023-11-25 RX ORDER — 0.9 % SODIUM CHLORIDE 0.9 %
1000 INTRAVENOUS SOLUTION INTRAVENOUS ONCE
Status: COMPLETED | OUTPATIENT
Start: 2023-11-25 | End: 2023-11-25

## 2023-11-25 RX ADMIN — SODIUM CHLORIDE 1000 ML: 9 INJECTION, SOLUTION INTRAVENOUS at 13:48

## 2023-11-25 ASSESSMENT — PAIN SCALES - GENERAL: PAINLEVEL_OUTOF10: 5

## 2023-11-25 ASSESSMENT — ENCOUNTER SYMPTOMS
SORE THROAT: 0
WHEEZING: 0
VOMITING: 0
ABDOMINAL DISTENTION: 0
COUGH: 0
SHORTNESS OF BREATH: 0
CHEST TIGHTNESS: 0
ABDOMINAL PAIN: 0
EYE DISCHARGE: 0
PHOTOPHOBIA: 0

## 2023-11-25 ASSESSMENT — PAIN DESCRIPTION - PAIN TYPE: TYPE: ACUTE PAIN

## 2023-11-25 ASSESSMENT — PAIN DESCRIPTION - ONSET: ONSET: SUDDEN

## 2023-11-25 ASSESSMENT — PAIN DESCRIPTION - LOCATION: LOCATION: FACE

## 2023-11-25 ASSESSMENT — PAIN DESCRIPTION - DESCRIPTORS: DESCRIPTORS: ACHING

## 2023-11-25 ASSESSMENT — PAIN - FUNCTIONAL ASSESSMENT
PAIN_FUNCTIONAL_ASSESSMENT: 0-10
PAIN_FUNCTIONAL_ASSESSMENT: NONE - DENIES PAIN

## 2023-11-25 ASSESSMENT — PAIN DESCRIPTION - ORIENTATION: ORIENTATION: LEFT

## 2023-11-25 ASSESSMENT — PAIN DESCRIPTION - FREQUENCY: FREQUENCY: CONTINUOUS

## 2023-11-25 NOTE — ED NOTES
Pt stable, a&ox4, skin w/d/pink, 0 pain, 0 distress, 0 c/o. Pt out form ed via w/c, going home with daughter. 0 problems.      Fatou Bernal RN  11/25/23 0790

## 2023-11-25 NOTE — ED TRIAGE NOTES
Pt arrived through triage d/t a fall she had this morning around 0900, the pt fell from standing striking her head on the wood floor. Pt has no bleeding but there is swelling to the left side of her face. Pt denies blood thinners and LOC but she can't remember why she fell.

## 2023-11-30 RX ORDER — DULAGLUTIDE 0.75 MG/.5ML
0.75 INJECTION, SOLUTION SUBCUTANEOUS
Qty: 2 ML | Refills: 0 | Status: SHIPPED | OUTPATIENT
Start: 2023-11-30 | End: 2023-12-26

## 2023-11-30 RX ORDER — AMLODIPINE BESYLATE 10 MG/1
10 TABLET ORAL DAILY
Qty: 30 TABLET | Refills: 0 | Status: SHIPPED | OUTPATIENT
Start: 2023-11-30 | End: 2023-12-04 | Stop reason: SDUPTHER

## 2023-11-30 NOTE — TELEPHONE ENCOUNTER
Mirta also in need of Trulicity 75mg 1x weekly and Amlodipine 10 mg 1x day to [facility] [place] [facility]

## 2023-12-04 ENCOUNTER — OFFICE VISIT (OUTPATIENT)
Dept: PRIMARY CARE | Facility: CLINIC | Age: 78
End: 2023-12-04
Payer: MEDICARE

## 2023-12-04 VITALS
RESPIRATION RATE: 16 BRPM | OXYGEN SATURATION: 98 % | BODY MASS INDEX: 24.02 KG/M2 | TEMPERATURE: 97.6 F | HEART RATE: 80 BPM | SYSTOLIC BLOOD PRESSURE: 124 MMHG | DIASTOLIC BLOOD PRESSURE: 62 MMHG | HEIGHT: 57 IN

## 2023-12-04 DIAGNOSIS — Z23 NEED FOR VACCINATION: ICD-10-CM

## 2023-12-04 DIAGNOSIS — R27.0 ATAXIA: ICD-10-CM

## 2023-12-04 DIAGNOSIS — R79.89 ELEVATED PLATELET COUNT: ICD-10-CM

## 2023-12-04 DIAGNOSIS — R31.9 HEMATURIA, UNSPECIFIED TYPE: ICD-10-CM

## 2023-12-04 DIAGNOSIS — R29.6 FREQUENT FALLS: ICD-10-CM

## 2023-12-04 DIAGNOSIS — G31.84 MILD COGNITIVE IMPAIRMENT: ICD-10-CM

## 2023-12-04 DIAGNOSIS — L89.309 PRESSURE INJURY OF SKIN OF BUTTOCK, UNSPECIFIED INJURY STAGE, UNSPECIFIED LATERALITY: ICD-10-CM

## 2023-12-04 DIAGNOSIS — E11.9 TYPE 2 DIABETES MELLITUS WITHOUT COMPLICATION, WITHOUT LONG-TERM CURRENT USE OF INSULIN (MULTI): ICD-10-CM

## 2023-12-04 DIAGNOSIS — I10 ESSENTIAL HYPERTENSION: ICD-10-CM

## 2023-12-04 PROCEDURE — 3074F SYST BP LT 130 MM HG: CPT | Performed by: FAMILY MEDICINE

## 2023-12-04 PROCEDURE — 99214 OFFICE O/P EST MOD 30 MIN: CPT | Performed by: FAMILY MEDICINE

## 2023-12-04 PROCEDURE — 1126F AMNT PAIN NOTED NONE PRSNT: CPT | Performed by: FAMILY MEDICINE

## 2023-12-04 PROCEDURE — 1159F MED LIST DOCD IN RCRD: CPT | Performed by: FAMILY MEDICINE

## 2023-12-04 PROCEDURE — 91322 SARSCOV2 VAC 50 MCG/0.5ML IM: CPT | Performed by: FAMILY MEDICINE

## 2023-12-04 PROCEDURE — 1160F RVW MEDS BY RX/DR IN RCRD: CPT | Performed by: FAMILY MEDICINE

## 2023-12-04 PROCEDURE — 90480 ADMN SARSCOV2 VAC 1/ONLY CMP: CPT | Performed by: FAMILY MEDICINE

## 2023-12-04 PROCEDURE — 1036F TOBACCO NON-USER: CPT | Performed by: FAMILY MEDICINE

## 2023-12-04 PROCEDURE — 3078F DIAST BP <80 MM HG: CPT | Performed by: FAMILY MEDICINE

## 2023-12-04 RX ORDER — AMLODIPINE BESYLATE 10 MG/1
10 TABLET ORAL DAILY
Qty: 30 TABLET | Refills: 5 | Status: SHIPPED | OUTPATIENT
Start: 2023-12-04 | End: 2023-12-26

## 2023-12-04 NOTE — PATIENT INSTRUCTIONS
RSV and Shingles vaccines are recommended. Medicare prefers you get these done at a pharmacy.  Pneumonia vaccine is also recommended

## 2023-12-04 NOTE — PROGRESS NOTES
Covid vax: UTD  Flu: UTD  Pneumo: declined  RSV: advised  Shingles: advised    CRC: 2014  Mammogram: 12/2022-ordered  Pap: hysterectomy  Lmp: hysterectomy

## 2023-12-04 NOTE — PROGRESS NOTES
"Subjective   Patient ID: Mirta Christianson is a 78 y.o. female who presents for Follow-up (With sore on buttock-s/p augmentin/Also follow up with cough); ER Follow-up (Fall x 2-no injuries); and requesting home health aid to assist in showering, etc.  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)    Overweight with body mass index (BMI) of 25 to 25.9 in adult    Venous stasis    Encounter for Medicare annual wellness exam    Mild cognitive impairment    Degenerative disease of nervous system, unspecified (CMS/[facility])       Past Surgical History:   Procedure Laterality Date    APPENDECTOMY      COLONOSCOPY  2014    per pt    HYSTERECTOMY  1997       Review of Systems no sz mi or cva    This patient has   NO history of recent Covid nor flu symptoms,  NO Fever nor chills,  NO Chest pain, shortness of breath nor paroxysmal nocturnal dyspnea,  NO Nausea, vomiting, nor diarrhea,  NO Hematochezia nor melena,  NO Dysuria, hematuria, nor new incontinence issues  NO new severe headaches nor neurological complaints,  NO new issues with anxiety nor depression nor new psychiatric complaints,  NO suicidal nor homicidal ideations.     OBJECTIVE:  /62   Pulse 80   Temp 36.4 °C (97.6 °F) (Temporal)   Resp 16   Ht 1.448 m (4' 9\")   LMP  (LMP Unknown)   SpO2 98%   BMI 24.02 kg/m²      General:  alert, oriented, no acute distress.  No " obvious skin rashes noted.   No gait disturbance noted.    Mood is pleasant, not tearful, no signs of emotional distress.  Not appearing intoxicated or altered.   No voiced delusions,   Normal, appropriate behavior.    HEENT: Normocephalic, atraumatic,   Pupils round, reactive to light  Extraocular motions intact and wnl  Tympanic membranes normal    Neck: no nuchal rigidity  No masses palpable.  No carotid bruits.  No thyromegaly.    Respiratory: Equal breath sounds  No wheezes,    rales,    nor rhonchi  No respiratory distress.    Heart: Regular rate and rhythm, no    murmurs  no rubs/gallops    Abdomen: no masses palpable, nontender, no rebound nor guarding.    Extremities: NO cyanosis noted, no clubbing.   No edema noted.  2+dorsalis pedis pulses.    Normal-not antalgic, steady gait. Slow w cane  Decubitus still stage 1 mildly red  healing    Office Visit on 11/20/2023   Component Date Value Ref Range Status    Respiratory Culture/Smear 11/20/2023 Normal throat rui   Final    Gram Stain 11/20/2023 Gram stain indicates specimen consists of lower respiratory tract secretions.   Final    Gram Stain 11/20/2023 No predominant organism   Final   Lab on 11/20/2023   Component Date Value Ref Range Status    WBC 11/20/2023 8.5  4.4 - 11.3 x10*3/uL Final    nRBC 11/20/2023 0.0  0.0 - 0.0 /100 WBCs Final    RBC 11/20/2023 4.67  4.00 - 5.20 x10*6/uL Final    Hemoglobin 11/20/2023 14.0  12.0 - 16.0 g/dL Final    Hematocrit 11/20/2023 42.5  36.0 - 46.0 % Final    MCV 11/20/2023 91  80 - 100 fL Final    MCH 11/20/2023 30.0  26.0 - 34.0 pg Final    MCHC 11/20/2023 32.9  32.0 - 36.0 g/dL Final    RDW 11/20/2023 13.2  11.5 - 14.5 % Final    Platelets 11/20/2023 571 (H)  150 - 450 x10*3/uL Final    Neutrophils % 11/20/2023 70.0  40.0 - 80.0 % Final    Immature Granulocytes %, Automated 11/20/2023 0.5  0.0 - 0.9 % Final    Immature Granulocyte Count (IG) includes promyelocytes, myelocytes and metamyelocytes but does not include  bands. Percent differential counts (%) should be interpreted in the context of the absolute cell counts (cells/UL).    Lymphocytes % 11/20/2023 16.3  13.0 - 44.0 % Final    Monocytes % 11/20/2023 11.9  2.0 - 10.0 % Final    Eosinophils % 11/20/2023 0.7  0.0 - 6.0 % Final    Basophils % 11/20/2023 0.6  0.0 - 2.0 % Final    Neutrophils Absolute 11/20/2023 5.94 (H)  1.60 - 5.50 x10*3/uL Final    Percent differential counts (%) should be interpreted in the context of the absolute cell counts (cells/uL).    Immature Granulocytes Absolute, Au* 11/20/2023 0.04  0.00 - 0.50 x10*3/uL Final    Lymphocytes Absolute 11/20/2023 1.38  0.80 - 3.00 x10*3/uL Final    Monocytes Absolute 11/20/2023 1.01 (H)  0.05 - 0.80 x10*3/uL Final    Eosinophils Absolute 11/20/2023 0.06  0.00 - 0.40 x10*3/uL Final    Basophils Absolute 11/20/2023 0.05  0.00 - 0.10 x10*3/uL Final    Glucose 11/20/2023 136 (H)  74 - 99 mg/dL Final    Sodium 11/20/2023 132 (L)  136 - 145 mmol/L Final    Potassium 11/20/2023 4.1  3.5 - 5.3 mmol/L Final    Chloride 11/20/2023 95 (L)  98 - 107 mmol/L Final    Bicarbonate 11/20/2023 26  21 - 32 mmol/L Final    Anion Gap 11/20/2023 15  10 - 20 mmol/L Final    Urea Nitrogen 11/20/2023 14  6 - 23 mg/dL Final    Creatinine 11/20/2023 0.59  0.50 - 1.05 mg/dL Final    eGFR 11/20/2023 >90  >60 mL/min/1.73m*2 Final    Calculations of estimated GFR are performed using the 2021 CKD-EPI Study Refit equation without the race variable for the IDMS-Traceable creatinine methods.  https://jasn.asnjournals.org/content/early/2021/09/22/ASN.3737667453    Calcium 11/20/2023 9.6  8.6 - 10.3 mg/dL Final    Albumin 11/20/2023 4.2  3.4 - 5.0 g/dL Final    Alkaline Phosphatase 11/20/2023 83  33 - 136 U/L Final    Total Protein 11/20/2023 7.5  6.4 - 8.2 g/dL Final    AST 11/20/2023 17  9 - 39 U/L Final    Bilirubin, Total 11/20/2023 0.6  0.0 - 1.2 mg/dL Final    ALT 11/20/2023 17  7 - 45 U/L Final    Patients treated with Sulfasalazine may  generate falsely decreased results for ALT.    Hemoglobin A1C 11/20/2023 6.8 (H)  see below % Final    Estimated Average Glucose 11/20/2023 148  Not Established mg/dL Final    Cholesterol 11/20/2023 168  0 - 199 mg/dL Final          Age      Desirable   Borderline High   High     0-19 Y     0 - 169       170 - 199     >/= 200    20-24 Y     0 - 189       190 - 224     >/= 225         >24 Y     0 - 199       200 - 239     >/= 240   **All ranges are based on fasting samples. Specific   therapeutic targets will vary based on patient-specific   cardiac risk.    Pediatric guidelines reference:Pediatrics 2011, 128(S5).Adult guidelines reference: NCEP ATPIII Guidelines,CHRISTOPH 2001, 258:2486-97    Venipuncture immediately after or during the administration of Metamizole may lead to falsely low results. Testing should be performed immediately prior to Metamizole dosing.    HDL-Cholesterol 11/20/2023 50.2  mg/dL Final      Age       Very Low   Low     Normal    High    0-19 Y    < 35      < 40     40-45     ----  20-24 Y    ----     < 40      >45      ----        >24 Y      ----     < 40     40-60      >60      Cholesterol/HDL Ratio 11/20/2023 3.3   Final      Ref Values  Desirable  < 3.4  High Risk  > 5.0    LDL Calculated 11/20/2023 101 (H)  <=99 mg/dL Final                                Near   Borderline      AGE      Desirable  Optimal    High     High     Very High     0-19 Y     0 - 109     ---    110-129   >/= 130     ----    20-24 Y     0 - 119     ---    120-159   >/= 160     ----      >24 Y     0 -  99   100-129  130-159   160-189     >/=190      VLDL 11/20/2023 17  0 - 40 mg/dL Final    Triglycerides 11/20/2023 84  0 - 149 mg/dL Final       Age         Desirable   Borderline High   High     Very High   0 D-90 D    19 - 174         ----         ----        ----  91 D- 9 Y     0 -  74        75 -  99     >/= 100      ----    10-19 Y     0 -  89        90 - 129     >/= 130      ----    20-24 Y     0 - 114       115 -  149     >/= 150      ----         >24 Y     0 - 149       150 - 199    200- 499    >/= 500    Venipuncture immediately after or during the administration of Metamizole may lead to falsely low results. Testing should be performed immediately prior to Metamizole dosing.    Non HDL Cholesterol 11/20/2023 118  0 - 149 mg/dL Final          Age       Desirable   Borderline High   High     Very High     0-19 Y     0 - 119       120 - 144     >/= 145    >/= 160    20-24 Y     0 - 149       150 - 189     >/= 190      ----         >24 Y    30 mg/dL above LDL Cholesterol goal     Lab on 11/17/2023   Component Date Value Ref Range Status    WBC 11/17/2023 8.6  4.4 - 11.3 x10*3/uL Final    RBC 11/17/2023 4.63  4.00 - 5.20 x10*6/uL Final    Hemoglobin 11/17/2023 14.0  12.0 - 16.0 g/dL Final    Hematocrit 11/17/2023 41.8  36.0 - 46.0 % Final    MCV 11/17/2023 90  80 - 100 fL Final    MCH 11/17/2023 30.2  26.0 - 34.0 pg Final    MCHC 11/17/2023 33.5  32.0 - 36.0 g/dL Final    RDW 11/17/2023 12.9  11.5 - 14.5 % Final    Platelets 11/17/2023 590 (H)  150 - 450 x10*3/uL Final    Neutrophils % 11/17/2023 70.4  40.0 - 80.0 % Final    Immature Granulocytes %, Automated 11/17/2023 0.1  0.0 - 0.9 % Final    Immature Granulocyte Count (IG) includes promyelocytes, myelocytes and metamyelocytes but does not include bands. Percent differential counts (%) should be interpreted in the context of the absolute cell counts (cells/UL).    Lymphocytes % 11/17/2023 18.3  13.0 - 44.0 % Final    Monocytes % 11/17/2023 10.0  2.0 - 10.0 % Final    Eosinophils % 11/17/2023 0.9  0.0 - 6.0 % Final    Basophils % 11/17/2023 0.3  0.0 - 2.0 % Final    Neutrophils Absolute 11/17/2023 6.05 (H)  1.60 - 5.50 x10*3/uL Final    Percent differential counts (%) should be interpreted in the context of the absolute cell counts (cells/uL).    Immature Granulocytes Absolute, Au* 11/17/2023 0.01  0.00 - 0.50 x10*3/uL Final    Lymphocytes Absolute 11/17/2023 1.57  0.80 -  3.00 x10*3/uL Final    Monocytes Absolute 11/17/2023 0.86 (H)  0.05 - 0.80 x10*3/uL Final    Eosinophils Absolute 11/17/2023 0.08  0.00 - 0.40 x10*3/uL Final    Basophils Absolute 11/17/2023 0.03  0.00 - 0.10 x10*3/uL Final    Extra Tube 11/17/2023 Hold for add-ons.   Final    Auto resulted.   Office Visit on 10/23/2023   Component Date Value Ref Range Status    Coronavirus 2019, PCR 10/23/2023 Not Detected  Not Detected Final    RSV PCR 10/23/2023 Not Detected  Not Detected Final    Flu A Result 10/23/2023 Not Detected  Not Detected Final    Flu B Result 10/23/2023 Not Detected  Not Detected Final        Assessment/Plan     Problem List Items Addressed This Visit       Ataxia    Relevant Orders    Referral to Home Care    Elevated platelet count    Essential hypertension    Relevant Medications    amLODIPine (Norvasc) 10 mg tablet    Frequent falls    Relevant Orders    Referral to Home Care    Type 2 diabetes mellitus without complication, without long-term current use of insulin (CMS/[facility])    Mild cognitive impairment     Other Visit Diagnoses       Pressure injury of skin of buttock, unspecified injury stage, unspecified laterality        Need for vaccination        Relevant Orders    Moderna COVID-19 vaccine, [id number], monovalent, age 12 years and older, (50mcg/0.5mL)            Urine recheck  Labs in 1-2mo  Recheck wbc darien  Add PT  The patient is aware that results will be forthcoming of ALL planned labs and or tests. If no results are received on my chart or by letter within 1 - 3 weeks, the patient is aware they need to call to obtain results, as this is not usual. Also, if any new conditions arise, or current condition worsens, it is understood that sooner appointment should be made or urgent care/convenient care or emergency room treatment should be sought depending on severity. Otherwise follow up for recheck at regular intervals as we have discussed, at least yearly.      And again had a lengthy  discussion w pt  about risks of poorly controlled diabetes including micro and macrovascular complications of DM2 including blindness,MI,CVA and death among other possibilities. Pt aware and agrees to better compliance and adherance to instructions such as regular eye exams q 1-2 y, foot exams,and f/u regularly for hba1c with a goal of 6.5.    NO evidence of neuropathy or nephropathy at this point    Follow up as planned for hba1c and BP checks REGULARLY.  See me by march 2024  Pt declines surgery for prolapse

## 2023-12-05 ENCOUNTER — LAB (OUTPATIENT)
Dept: LAB | Facility: LAB | Age: 78
End: 2023-12-05
Payer: MEDICARE

## 2023-12-05 ENCOUNTER — HOME HEALTH ADMISSION (OUTPATIENT)
Dept: HOME HEALTH SERVICES | Facility: HOME HEALTH | Age: 78
End: 2023-12-05
Payer: MEDICARE

## 2023-12-05 DIAGNOSIS — R31.9 HEMATURIA, UNSPECIFIED TYPE: ICD-10-CM

## 2023-12-05 DIAGNOSIS — E11.9 TYPE 2 DIABETES MELLITUS WITHOUT COMPLICATION, WITHOUT LONG-TERM CURRENT USE OF INSULIN (MULTI): ICD-10-CM

## 2023-12-05 LAB
APPEARANCE UR: ABNORMAL
BILIRUB UR STRIP.AUTO-MCNC: NEGATIVE MG/DL
COLOR UR: YELLOW
GLUCOSE UR STRIP.AUTO-MCNC: ABNORMAL MG/DL
KETONES UR STRIP.AUTO-MCNC: NEGATIVE MG/DL
LEUKOCYTE ESTERASE UR QL STRIP.AUTO: NEGATIVE
NITRITE UR QL STRIP.AUTO: NEGATIVE
PH UR STRIP.AUTO: 5 [PH]
PROT UR STRIP.AUTO-MCNC: NEGATIVE MG/DL
RBC # UR STRIP.AUTO: NEGATIVE /UL
SP GR UR STRIP.AUTO: 1.01
UROBILINOGEN UR STRIP.AUTO-MCNC: <2 MG/DL

## 2023-12-05 PROCEDURE — 87086 URINE CULTURE/COLONY COUNT: CPT

## 2023-12-05 PROCEDURE — 81003 URINALYSIS AUTO W/O SCOPE: CPT

## 2023-12-07 ENCOUNTER — HOME CARE VISIT (OUTPATIENT)
Dept: HOME HEALTH SERVICES | Facility: HOME HEALTH | Age: 78
End: 2023-12-07
Payer: MEDICARE

## 2023-12-07 VITALS
SYSTOLIC BLOOD PRESSURE: 136 MMHG | RESPIRATION RATE: 16 BRPM | TEMPERATURE: 98.6 F | HEART RATE: 76 BPM | OXYGEN SATURATION: 96 % | DIASTOLIC BLOOD PRESSURE: 64 MMHG

## 2023-12-07 LAB — BACTERIA UR CULT: ABNORMAL

## 2023-12-07 PROCEDURE — 169592 NO-PAY CLAIM PROCEDURE

## 2023-12-07 PROCEDURE — G0299 HHS/HOSPICE OF RN EA 15 MIN: HCPCS | Mod: HHH

## 2023-12-07 PROCEDURE — 1090000001 HH PPS REVENUE CREDIT

## 2023-12-07 PROCEDURE — 0023 HH SOC

## 2023-12-07 PROCEDURE — 1090000002 HH PPS REVENUE DEBIT

## 2023-12-08 PROCEDURE — 1090000001 HH PPS REVENUE CREDIT

## 2023-12-08 PROCEDURE — 1090000002 HH PPS REVENUE DEBIT

## 2023-12-09 PROCEDURE — 1090000001 HH PPS REVENUE CREDIT

## 2023-12-09 PROCEDURE — 1090000002 HH PPS REVENUE DEBIT

## 2023-12-10 PROCEDURE — 1090000002 HH PPS REVENUE DEBIT

## 2023-12-10 PROCEDURE — 1090000001 HH PPS REVENUE CREDIT

## 2023-12-11 DIAGNOSIS — E11.9 TYPE 2 DIABETES MELLITUS WITHOUT COMPLICATION, WITHOUT LONG-TERM CURRENT USE OF INSULIN (MULTI): ICD-10-CM

## 2023-12-11 PROCEDURE — 1090000001 HH PPS REVENUE CREDIT

## 2023-12-11 PROCEDURE — 1090000002 HH PPS REVENUE DEBIT

## 2023-12-11 RX ORDER — BLOOD-GLUCOSE METER
EACH MISCELLANEOUS
Qty: 200 STRIP | Refills: 1 | Status: SHIPPED | OUTPATIENT
Start: 2023-12-11 | End: 2023-12-26

## 2023-12-11 ASSESSMENT — ENCOUNTER SYMPTOMS
PAIN LOCATION - PAIN SEVERITY: 4/10
PAIN: 1
PAIN LOCATION: RIGHT LEG
HIGHEST PAIN SEVERITY IN PAST 24 HOURS: 7/10
PAIN LOCATION - PAIN SEVERITY: 4/10
PAIN SEVERITY GOAL: 4/10
PERSON REPORTING PAIN: PATIENT
PAIN LOCATION: LEFT LEG
LOWEST PAIN SEVERITY IN PAST 24 HOURS: 3/10
MUSCLE WEAKNESS: 1

## 2023-12-11 ASSESSMENT — ACTIVITIES OF DAILY LIVING (ADL)
OASIS_M1830: 05
ENTERING_EXITING_HOME: NEEDS ASSISTANCE

## 2023-12-11 NOTE — TELEPHONE ENCOUNTER
Patient's daughter Jesenia phones the office today w/ request to renew the patient's Blood Sugar Diagnostic (One Touch Verio test strips) strip.     Patient is testing twice daily.     Patient would like this to be sent to [facility] on [address] In [place].     Thank you.

## 2023-12-12 ENCOUNTER — HOME CARE VISIT (OUTPATIENT)
Dept: HOME HEALTH SERVICES | Facility: HOME HEALTH | Age: 78
End: 2023-12-12
Payer: MEDICARE

## 2023-12-12 PROCEDURE — G0155 HHCP-SVS OF CSW,EA 15 MIN: HCPCS | Mod: HHH

## 2023-12-12 PROCEDURE — 1090000001 HH PPS REVENUE CREDIT

## 2023-12-12 PROCEDURE — G0152 HHCP-SERV OF OT,EA 15 MIN: HCPCS | Mod: HHH

## 2023-12-12 PROCEDURE — 1090000002 HH PPS REVENUE DEBIT

## 2023-12-12 SDOH — HEALTH STABILITY: MENTAL HEALTH: STRESS FACTORS COMMENTS: CAREGIVING

## 2023-12-12 ASSESSMENT — ACTIVITIES OF DAILY LIVING (ADL)
TOILETING: MODERATE ASSIST
DRESSING_REQUIRES_ASSISTANCE: 1
TOILETING: 1
BATHING_REQUIRES_ASSISTANCE: 1
AMBULATION ASSISTANCE: 1
PHYSICAL TRANSFERS ASSESSED: 1
DRESSING_LB_CURRENT_FUNCTION: MODERATE ASSIST
DRESSING_UB_CURRENT_FUNCTION: MINIMUM ASSIST
LAUNDRY_REQUIRES_ASSISTANCE: 1
SHOPPING_REQUIRES_ASSISTANCE: 1

## 2023-12-12 ASSESSMENT — ENCOUNTER SYMPTOMS
PAIN: 1
PAIN LOCATION - PAIN FREQUENCY: INTERMITTENT
PERSON REPORTING PAIN: PATIENT
PAIN LOCATION: RIGHT ELBOW
PAIN LOCATION - PAIN SEVERITY: 5/10
PAIN LOCATION - EXACERBATING FACTORS: USING MY ARMS
PAIN LOCATION - PAIN QUALITY: JUST HURTS

## 2023-12-13 ENCOUNTER — HOME CARE VISIT (OUTPATIENT)
Dept: HOME HEALTH SERVICES | Facility: HOME HEALTH | Age: 78
End: 2023-12-13
Payer: MEDICARE

## 2023-12-13 VITALS — TEMPERATURE: 97.7 F

## 2023-12-13 PROCEDURE — 1090000002 HH PPS REVENUE DEBIT

## 2023-12-13 PROCEDURE — G0151 HHCP-SERV OF PT,EA 15 MIN: HCPCS | Mod: HHH

## 2023-12-13 PROCEDURE — 1090000001 HH PPS REVENUE CREDIT

## 2023-12-13 SDOH — HEALTH STABILITY: PHYSICAL HEALTH: EXERCISE ACTIVITY: HIP FLEXION

## 2023-12-13 SDOH — HEALTH STABILITY: PHYSICAL HEALTH: EXERCISE ACTIVITIES SETS: 1

## 2023-12-13 SDOH — HEALTH STABILITY: PHYSICAL HEALTH: PHYSICAL EXERCISE: 9

## 2023-12-13 SDOH — HEALTH STABILITY: PHYSICAL HEALTH: PHYSICAL EXERCISE: SEATED

## 2023-12-13 SDOH — HEALTH STABILITY: PHYSICAL HEALTH: EXERCISE TYPE: INSTRUCTED AND DEMONSTRATED SBA SEATED THER EX PROGRAM

## 2023-12-13 SDOH — HEALTH STABILITY: PHYSICAL HEALTH: EXERCISE ACTIVITY: KNEE FLEXION

## 2023-12-13 SDOH — HEALTH STABILITY: PHYSICAL HEALTH

## 2023-12-13 SDOH — HEALTH STABILITY: PHYSICAL HEALTH: PHYSICAL EXERCISE: 5

## 2023-12-13 SDOH — HEALTH STABILITY: PHYSICAL HEALTH: EXERCISE ACTIVITY: ANKLE PUMPS

## 2023-12-13 SDOH — HEALTH STABILITY: PHYSICAL HEALTH: EXERCISE ACTIVITY: SIT TO STAND

## 2023-12-13 SDOH — HEALTH STABILITY: PHYSICAL HEALTH: EXERCISE ACTIVITY: HIP ABD/ADDUCTION

## 2023-12-13 SDOH — HEALTH STABILITY: PHYSICAL HEALTH: EXERCISE ACTIVITY: KNEE EXTENSION

## 2023-12-13 SDOH — HEALTH STABILITY: PHYSICAL HEALTH: PHYSICAL EXERCISE: 10

## 2023-12-13 SDOH — HEALTH STABILITY: PHYSICAL HEALTH: PHYSICAL EXERCISE: 7

## 2023-12-13 SDOH — HEALTH STABILITY: PHYSICAL HEALTH: PHYSICAL EXERCISE: 8

## 2023-12-13 ASSESSMENT — ACTIVITIES OF DAILY LIVING (ADL)
AMBULATION ASSISTANCE: ONE PERSON
AMBULATION ASSISTANCE ON FLAT SURFACES: 1
AMBULATION ASSISTANCE: STAND BY ASSIST
CURRENT_FUNCTION: STAND BY ASSIST

## 2023-12-13 ASSESSMENT — ENCOUNTER SYMPTOMS
PAIN: 1
OCCASIONAL FEELINGS OF UNSTEADINESS: 1
SUBJECTIVE PAIN PROGRESSION: WAXING AND WANING
PAIN LOCATION - PAIN SEVERITY: 0/10
LOWEST PAIN SEVERITY IN PAST 24 HOURS: 0/10
PAIN LOCATION - PAIN QUALITY: ACHING
PERSON REPORTING PAIN: PATIENT
PAIN LOCATION: RIGHT KNEE
PAIN LOCATION - PAIN FREQUENCY: WITH ACTIVITY
PAIN SEVERITY GOAL: 0/10
MUSCLE WEAKNESS: 1
HIGHEST PAIN SEVERITY IN PAST 24 HOURS: 4/10

## 2023-12-14 ENCOUNTER — HOME CARE VISIT (OUTPATIENT)
Dept: HOME HEALTH SERVICES | Facility: HOME HEALTH | Age: 78
End: 2023-12-14
Payer: MEDICARE

## 2023-12-14 PROCEDURE — 1090000001 HH PPS REVENUE CREDIT

## 2023-12-14 PROCEDURE — 1090000002 HH PPS REVENUE DEBIT

## 2023-12-15 ENCOUNTER — HOME CARE VISIT (OUTPATIENT)
Dept: HOME HEALTH SERVICES | Facility: HOME HEALTH | Age: 78
End: 2023-12-15
Payer: MEDICARE

## 2023-12-15 VITALS — TEMPERATURE: 97.8 F

## 2023-12-15 VITALS
TEMPERATURE: 97.6 F | SYSTOLIC BLOOD PRESSURE: 126 MMHG | HEART RATE: 74 BPM | DIASTOLIC BLOOD PRESSURE: 72 MMHG | RESPIRATION RATE: 16 BRPM

## 2023-12-15 PROCEDURE — G0299 HHS/HOSPICE OF RN EA 15 MIN: HCPCS | Mod: HHH

## 2023-12-15 PROCEDURE — G0158 HHC OT ASSISTANT EA 15: HCPCS | Mod: HHH

## 2023-12-15 PROCEDURE — 1090000002 HH PPS REVENUE DEBIT

## 2023-12-15 PROCEDURE — G0156 HHCP-SVS OF AIDE,EA 15 MIN: HCPCS | Mod: HHH

## 2023-12-15 PROCEDURE — 1090000001 HH PPS REVENUE CREDIT

## 2023-12-15 SDOH — HEALTH STABILITY: PHYSICAL HEALTH: PHYSICAL EXERCISE: 10

## 2023-12-15 SDOH — HEALTH STABILITY: PHYSICAL HEALTH: PHYSICAL EXERCISE: SEATED

## 2023-12-15 SDOH — HEALTH STABILITY: PHYSICAL HEALTH: EXERCISE ACTIVITY: KNEE FLEXION

## 2023-12-15 SDOH — HEALTH STABILITY: PHYSICAL HEALTH

## 2023-12-15 SDOH — HEALTH STABILITY: PHYSICAL HEALTH: EXERCISE ACTIVITIES SETS: 1

## 2023-12-15 SDOH — HEALTH STABILITY: PHYSICAL HEALTH: EXERCISE ACTIVITY: HIP FLEXION

## 2023-12-15 SDOH — HEALTH STABILITY: PHYSICAL HEALTH: EXERCISE ACTIVITY: SIT TO STAND

## 2023-12-15 SDOH — HEALTH STABILITY: PHYSICAL HEALTH: EXERCISE ACTIVITY: ANKLE PUMPS

## 2023-12-15 SDOH — HEALTH STABILITY: PHYSICAL HEALTH: PHYSICAL EXERCISE: 8

## 2023-12-15 SDOH — HEALTH STABILITY: PHYSICAL HEALTH: EXERCISE ACTIVITY: HIP ABD/ADDUCTION

## 2023-12-15 SDOH — HEALTH STABILITY: PHYSICAL HEALTH: EXERCISE TYPE: INSTRUCTED AND DEMONSTRATED SBA SEATED THER EX PROGRAM

## 2023-12-15 SDOH — HEALTH STABILITY: PHYSICAL HEALTH: EXERCISE ACTIVITY: KNEE EXTENSION

## 2023-12-15 ASSESSMENT — PAIN SCALES - PAIN ASSESSMENT IN ADVANCED DEMENTIA (PAINAD)
FACIALEXPRESSION: 0
TOTALSCORE: 0
BODYLANGUAGE: 0
CONSOLABILITY: 0 - NO NEED TO CONSOLE.
BODYLANGUAGE: 0 - RELAXED.
NEGVOCALIZATION: 0 - NONE.
FACIALEXPRESSION: 0 - SMILING OR INEXPRESSIVE.
CONSOLABILITY: 0
NEGVOCALIZATION: 0
BREATHING: 0

## 2023-12-15 ASSESSMENT — ACTIVITIES OF DAILY LIVING (ADL)
DRESSING_UB_CURRENT_FUNCTION: MINIMUM ASSIST
WASHING_HAIR_CURRENT_FUNCTION: DEPENDENT
PHYSICAL TRANSFERS ASSESSED: 1
CURRENT_FUNCTION: STAND BY ASSIST
BATHING ASSESSED: 1
WASHING_LB_CURRENT_FUNCTION: MODERATE ASSIST
WASHING_BACK_CURRENT_FUNCTION: DEPENDENT
DRESSING_LB_CURRENT_FUNCTION: MODERATE ASSIST
AMBULATION ASSISTANCE: STAND BY ASSIST
WASHING_LB_CURRENT_FUNCTION: ONE PERSON
WASHING_UPB_CURRENT_FUNCTION: MODERATE ASSIST
AMBULATION ASSISTANCE ON FLAT SURFACES: 1
BATHING_CURRENT_FUNCTION: MODERATE ASSIST
AMBULATION ASSISTANCE: 1
CURRENT_FUNCTION: MODERATE ASSIST

## 2023-12-15 ASSESSMENT — ENCOUNTER SYMPTOMS
PERSON REPORTING PAIN: PATIENT
DENIES PAIN: 1
ANGER WITHIN DEFINED LIMITS: 1
FORGETFULNESS: 1
DESCRIPTION OF MEMORY LOSS: SHORT TERM
AGGRESSION WITHIN DEFINED LIMITS: 1
DENIES PAIN: 1

## 2023-12-16 PROCEDURE — 1090000001 HH PPS REVENUE CREDIT

## 2023-12-16 PROCEDURE — 1090000002 HH PPS REVENUE DEBIT

## 2023-12-16 ASSESSMENT — ENCOUNTER SYMPTOMS
DENIES PAIN: 1
APPETITE LEVEL: GOOD
COUGH CHARACTERISTICS: PRODUCTIVE
MUSCLE WEAKNESS: 1
COUGH: 1
CHANGE IN APPETITE: UNCHANGED

## 2023-12-17 PROCEDURE — 1090000002 HH PPS REVENUE DEBIT

## 2023-12-17 PROCEDURE — 1090000001 HH PPS REVENUE CREDIT

## 2023-12-18 ENCOUNTER — HOME CARE VISIT (OUTPATIENT)
Dept: HOME HEALTH SERVICES | Facility: HOME HEALTH | Age: 78
End: 2023-12-18
Payer: MEDICARE

## 2023-12-18 VITALS
SYSTOLIC BLOOD PRESSURE: 134 MMHG | DIASTOLIC BLOOD PRESSURE: 76 MMHG | OXYGEN SATURATION: 98 % | TEMPERATURE: 97.8 F | HEART RATE: 70 BPM

## 2023-12-18 PROCEDURE — 1090000001 HH PPS REVENUE CREDIT

## 2023-12-18 PROCEDURE — G0157 HHC PT ASSISTANT EA 15: HCPCS | Mod: HHH

## 2023-12-18 PROCEDURE — 1090000002 HH PPS REVENUE DEBIT

## 2023-12-18 SDOH — HEALTH STABILITY: PHYSICAL HEALTH
EXERCISE COMMENTS: STRENGTH TRAINING WITH SEATED EXERCISES ANKLE PUMPS, HIP FLEXION, KNEE EXTENSION, RESISTED ADDUCTION, HIP AB/ADDUCTION, PARTIAL LIFTS, WITH REINFORCEMENT FOR ALIGNMENT AND TECHNIQUE, BREATHING OUT WITH EXERTION 10 REPS.

## 2023-12-19 ENCOUNTER — HOME CARE VISIT (OUTPATIENT)
Dept: HOME HEALTH SERVICES | Facility: HOME HEALTH | Age: 78
End: 2023-12-19
Payer: MEDICARE

## 2023-12-19 PROCEDURE — 1090000002 HH PPS REVENUE DEBIT

## 2023-12-19 PROCEDURE — 1090000001 HH PPS REVENUE CREDIT

## 2023-12-20 ENCOUNTER — HOME CARE VISIT (OUTPATIENT)
Dept: HOME HEALTH SERVICES | Facility: HOME HEALTH | Age: 78
End: 2023-12-20
Payer: MEDICARE

## 2023-12-20 VITALS — HEART RATE: 71 BPM | OXYGEN SATURATION: 97 % | TEMPERATURE: 97.6 F

## 2023-12-20 PROCEDURE — 1090000002 HH PPS REVENUE DEBIT

## 2023-12-20 PROCEDURE — G0157 HHC PT ASSISTANT EA 15: HCPCS | Mod: HHH

## 2023-12-20 PROCEDURE — G0158 HHC OT ASSISTANT EA 15: HCPCS | Mod: HHH

## 2023-12-20 PROCEDURE — 1090000001 HH PPS REVENUE CREDIT

## 2023-12-20 ASSESSMENT — ENCOUNTER SYMPTOMS
DESCRIPTION OF MEMORY LOSS: SHORT TERM
PERSON REPORTING PAIN: PATIENT
DENIES PAIN: 1
FORGETFULNESS: 1
AGGRESSION WITHIN DEFINED LIMITS: 1
DESCRIPTION OF MEMORY LOSS: LONG TERM
DENIES PAIN: 1
ANGER WITHIN DEFINED LIMITS: 1

## 2023-12-20 ASSESSMENT — PAIN SCALES - PAIN ASSESSMENT IN ADVANCED DEMENTIA (PAINAD)
FACIALEXPRESSION: 0
BODYLANGUAGE: 0 - RELAXED.
NEGVOCALIZATION: 0
BREATHING: 0
CONSOLABILITY: 0
NEGVOCALIZATION: 0 - NONE.
CONSOLABILITY: 0 - NO NEED TO CONSOLE.
FACIALEXPRESSION: 0 - SMILING OR INEXPRESSIVE.
BODYLANGUAGE: 0
TOTALSCORE: 0

## 2023-12-21 ENCOUNTER — HOME CARE VISIT (OUTPATIENT)
Dept: HOME HEALTH SERVICES | Facility: HOME HEALTH | Age: 78
End: 2023-12-21
Payer: MEDICARE

## 2023-12-21 VITALS
DIASTOLIC BLOOD PRESSURE: 56 MMHG | OXYGEN SATURATION: 97 % | RESPIRATION RATE: 18 BRPM | HEART RATE: 72 BPM | SYSTOLIC BLOOD PRESSURE: 100 MMHG | TEMPERATURE: 97.2 F

## 2023-12-21 PROCEDURE — 1090000001 HH PPS REVENUE CREDIT

## 2023-12-21 PROCEDURE — 1090000002 HH PPS REVENUE DEBIT

## 2023-12-21 PROCEDURE — G0158 HHC OT ASSISTANT EA 15: HCPCS | Mod: HHH

## 2023-12-21 PROCEDURE — G0300 HHS/HOSPICE OF LPN EA 15 MIN: HCPCS | Mod: HHH

## 2023-12-21 ASSESSMENT — ENCOUNTER SYMPTOMS
DESCRIPTION OF MEMORY LOSS: SHORT TERM
PAIN: 1
SUBJECTIVE PAIN PROGRESSION: UNCHANGED
PAIN LOCATION - PAIN SEVERITY: 5/10
AGGRESSION WITHIN DEFINED LIMITS: 1
PAIN SEVERITY GOAL: 1/10
SUBJECTIVE PAIN PROGRESSION: GRADUALLY IMPROVING
BLURRED VISION: 1
PAIN SEVERITY GOAL: 0/10
LOWEST PAIN SEVERITY IN PAST 24 HOURS: 0/10
HIGHEST PAIN SEVERITY IN PAST 24 HOURS: 5/10
FORGETFULNESS: 1
CHANGE IN APPETITE: UNCHANGED
PAIN: 1
APPETITE LEVEL: GOOD
DESCRIPTION OF MEMORY LOSS: LONG TERM
LAST BOWEL MOVEMENT: 66828
DEPRESSION: 0
PERSON REPORTING PAIN: PATIENT
ANGER WITHIN DEFINED LIMITS: 1
LOWEST PAIN SEVERITY IN PAST 24 HOURS: 1/10
HIGHEST PAIN SEVERITY IN PAST 24 HOURS: 5/10
PERSON REPORTING PAIN: PATIENT
BOWEL PATTERN NORMAL: 1
OCCASIONAL FEELINGS OF UNSTEADINESS: 1
LOSS OF SENSATION IN FEET: 0
PAIN LOCATION: RIGHT ELBOW

## 2023-12-21 ASSESSMENT — PAIN SCALES - PAIN ASSESSMENT IN ADVANCED DEMENTIA (PAINAD)
NEGVOCALIZATION: 0 - NONE.
BREATHING: 0
NEGVOCALIZATION: 0
BODYLANGUAGE: 0
TOTALSCORE: 0
FACIALEXPRESSION: 0
CONSOLABILITY: 0
NEGVOCALIZATION: 0
CONSOLABILITY: 0 - NO NEED TO CONSOLE.
BREATHING: 0
FACIALEXPRESSION: 0 - SMILING OR INEXPRESSIVE.
BODYLANGUAGE: 0 - RELAXED.
BODYLANGUAGE: 0
TOTALSCORE: 0
CONSOLABILITY: 0 - NO NEED TO CONSOLE.
FACIALEXPRESSION: 0
BODYLANGUAGE: 0 - RELAXED.
NEGVOCALIZATION: 0 - NONE.
FACIALEXPRESSION: 0 - SMILING OR INEXPRESSIVE.
CONSOLABILITY: 0

## 2023-12-21 ASSESSMENT — ACTIVITIES OF DAILY LIVING (ADL): MONEY MANAGEMENT (EXPENSES/BILLS): TOTALLY DEPENDENT

## 2023-12-21 NOTE — HOME HEALTH
SN visit completed. VSS. C/O 5/10 pain to elbow likely from recent fall. Physician aware of fall per CG. No concerns.

## 2023-12-22 PROCEDURE — 1090000001 HH PPS REVENUE CREDIT

## 2023-12-22 PROCEDURE — G0180 MD CERTIFICATION HHA PATIENT: HCPCS | Performed by: FAMILY MEDICINE

## 2023-12-22 PROCEDURE — 1090000002 HH PPS REVENUE DEBIT

## 2023-12-23 ENCOUNTER — HOSPITAL ENCOUNTER (INPATIENT)
Age: 78
LOS: 1 days | DRG: 871 | End: 2023-12-24
Attending: STUDENT IN AN ORGANIZED HEALTH CARE EDUCATION/TRAINING PROGRAM | Admitting: STUDENT IN AN ORGANIZED HEALTH CARE EDUCATION/TRAINING PROGRAM
Payer: MEDICARE

## 2023-12-23 ENCOUNTER — APPOINTMENT (OUTPATIENT)
Dept: CT IMAGING | Age: 78
DRG: 871 | End: 2023-12-23
Payer: MEDICARE

## 2023-12-23 DIAGNOSIS — E86.0 DEHYDRATION: ICD-10-CM

## 2023-12-23 DIAGNOSIS — N39.0 URINARY TRACT INFECTION WITH HEMATURIA, SITE UNSPECIFIED: Primary | ICD-10-CM

## 2023-12-23 DIAGNOSIS — R31.9 URINARY TRACT INFECTION WITH HEMATURIA, SITE UNSPECIFIED: Primary | ICD-10-CM

## 2023-12-23 DIAGNOSIS — I21.4 NSTEMI (NON-ST ELEVATED MYOCARDIAL INFARCTION) (HCC): ICD-10-CM

## 2023-12-23 DIAGNOSIS — R41.82 ALTERED MENTAL STATUS, UNSPECIFIED ALTERED MENTAL STATUS TYPE: ICD-10-CM

## 2023-12-23 DIAGNOSIS — A41.9 SEPTICEMIA (HCC): ICD-10-CM

## 2023-12-23 LAB
ALBUMIN SERPL-MCNC: 3.7 G/DL (ref 3.5–4.6)
ALP SERPL-CCNC: 95 U/L (ref 40–130)
ALT SERPL-CCNC: 56 U/L (ref 0–33)
ANION GAP SERPL CALCULATED.3IONS-SCNC: 16 MEQ/L (ref 9–15)
AST SERPL-CCNC: 66 U/L (ref 0–35)
BACTERIA URNS QL MICRO: ABNORMAL /HPF
BASE EXCESS VENOUS: -7 (ref -3–3)
BASOPHILS # BLD: 0 K/UL (ref 0–0.2)
BASOPHILS NFR BLD: 0.2 %
BILIRUB SERPL-MCNC: 1.3 MG/DL (ref 0.2–0.7)
BILIRUB UR QL STRIP: NEGATIVE
BNP BLD-MCNC: 5942 PG/ML
BUN SERPL-MCNC: 41 MG/DL (ref 8–23)
CALCIUM IONIZED: 1.19 MMOL/L (ref 1.12–1.32)
CALCIUM SERPL-MCNC: 9.4 MG/DL (ref 8.5–9.9)
CHLORIDE SERPL-SCNC: 92 MEQ/L (ref 95–107)
CK SERPL-CCNC: 112 U/L (ref 0–170)
CLARITY UR: ABNORMAL
CO2 SERPL-SCNC: 19 MEQ/L (ref 20–31)
COLOR UR: YELLOW
CREAT SERPL-MCNC: 1.01 MG/DL (ref 0.5–0.9)
EOSINOPHIL # BLD: 0 K/UL (ref 0–0.7)
EOSINOPHIL NFR BLD: 0 %
EPI CELLS #/AREA URNS AUTO: ABNORMAL /HPF (ref 0–5)
ERYTHROCYTE [DISTWIDTH] IN BLOOD BY AUTOMATED COUNT: 14.2 % (ref 11.5–14.5)
GLOBULIN SER CALC-MCNC: 2.8 G/DL (ref 2.3–3.5)
GLUCOSE BLD-MCNC: 321 MG/DL (ref 70–99)
GLUCOSE BLD-MCNC: 348 MG/DL (ref 70–99)
GLUCOSE SERPL-MCNC: 342 MG/DL (ref 70–99)
GLUCOSE UR STRIP-MCNC: >=1000 MG/DL
HCO3 VENOUS: 18.4 MMOL/L (ref 23–29)
HCT VFR BLD AUTO: 31 % (ref 36–48)
HCT VFR BLD AUTO: 38 % (ref 37–47)
HGB BLD CALC-MCNC: 10.6 GM/DL (ref 12–16)
HGB BLD-MCNC: 12.6 G/DL (ref 12–16)
HGB UR QL STRIP: ABNORMAL
HYALINE CASTS #/AREA URNS AUTO: ABNORMAL /HPF (ref 0–5)
INFLUENZA A BY PCR: NEGATIVE
INFLUENZA B BY PCR: NEGATIVE
KETONES UR STRIP-MCNC: ABNORMAL MG/DL
LACTATE BLDV-SCNC: 1.8 MMOL/L (ref 0.5–2.2)
LACTATE BLDV-SCNC: 2.2 MMOL/L (ref 0.5–2.2)
LACTATE: 1.5 MMOL/L (ref 0.4–2)
LEUKOCYTE ESTERASE UR QL STRIP: ABNORMAL
LYMPHOCYTES # BLD: 0.5 K/UL (ref 1–4.8)
LYMPHOCYTES NFR BLD: 4.1 %
MAGNESIUM SERPL-MCNC: 2 MG/DL (ref 1.7–2.4)
MCH RBC QN AUTO: 29.9 PG (ref 27–31.3)
MCHC RBC AUTO-ENTMCNC: 33.2 % (ref 33–37)
MCV RBC AUTO: 90 FL (ref 79.4–94.8)
MONOCYTES # BLD: 0.6 K/UL (ref 0.2–0.8)
MONOCYTES NFR BLD: 5.3 %
NEUTROPHILS # BLD: 10.8 K/UL (ref 1.4–6.5)
NEUTS SEG NFR BLD: 89.8 %
NITRITE UR QL STRIP: POSITIVE
O2 SAT, VEN: 73 %
PCO2, VEN: 31.8 MM HG (ref 40–50)
PERFORMED ON: ABNORMAL
PERFORMED ON: ABNORMAL
PH UR STRIP: 5 [PH] (ref 5–9)
PH VENOUS: 7.37 (ref 7.32–7.42)
PLATELET # BLD AUTO: 431 K/UL (ref 130–400)
PO2, VEN: 39 MM HG
POC CHLORIDE: 102 MEQ/L (ref 99–110)
POC CREATININE: 0.8 MG/DL (ref 0.6–1.2)
POC SAMPLE TYPE: ABNORMAL
POTASSIUM SERPL-SCNC: 3.6 MEQ/L (ref 3.4–4.9)
POTASSIUM SERPL-SCNC: 3.6 MEQ/L (ref 3.5–5.1)
POTASSIUM SERPL-SCNC: 5.4 MEQ/L (ref 3.4–4.9)
PROCALCITONIN SERPL IA-MCNC: 2.92 NG/ML (ref 0–0.15)
PROT SERPL-MCNC: 6.5 G/DL (ref 6.3–8)
PROT UR STRIP-MCNC: 30 MG/DL
RBC # BLD AUTO: 4.22 M/UL (ref 4.2–5.4)
RBC #/AREA URNS AUTO: ABNORMAL /HPF (ref 0–5)
SARS-COV-2 RDRP RESP QL NAA+PROBE: NOT DETECTED
SODIUM BLD-SCNC: 132 MEQ/L (ref 136–145)
SODIUM SERPL-SCNC: 127 MEQ/L (ref 135–144)
SP GR UR STRIP: 1.03 (ref 1–1.03)
TCO2 CALC VENOUS: 19 MMOL/L
TROPONIN, HIGH SENSITIVITY: 215 NG/L (ref 0–19)
TROPONIN, HIGH SENSITIVITY: 247 NG/L (ref 0–19)
TROPONIN, HIGH SENSITIVITY: 300 NG/L (ref 0–19)
TSH REFLEX: 0.87 UIU/ML (ref 0.44–3.86)
URINE REFLEX TO CULTURE: YES
UROBILINOGEN UR STRIP-ACNC: 1 E.U./DL
WBC # BLD AUTO: 12 K/UL (ref 4.8–10.8)
WBC #/AREA URNS AUTO: >100 /HPF (ref 0–5)

## 2023-12-23 PROCEDURE — 1090000002 HH PPS REVENUE DEBIT

## 2023-12-23 PROCEDURE — 80053 COMPREHEN METABOLIC PANEL: CPT

## 2023-12-23 PROCEDURE — 87186 SC STD MICRODIL/AGAR DIL: CPT

## 2023-12-23 PROCEDURE — 99285 EMERGENCY DEPT VISIT HI MDM: CPT

## 2023-12-23 PROCEDURE — 81001 URINALYSIS AUTO W/SCOPE: CPT

## 2023-12-23 PROCEDURE — 96365 THER/PROPH/DIAG IV INF INIT: CPT

## 2023-12-23 PROCEDURE — 72128 CT CHEST SPINE W/O DYE: CPT

## 2023-12-23 PROCEDURE — 36600 WITHDRAWAL OF ARTERIAL BLOOD: CPT

## 2023-12-23 PROCEDURE — 72131 CT LUMBAR SPINE W/O DYE: CPT

## 2023-12-23 PROCEDURE — 87077 CULTURE AEROBIC IDENTIFY: CPT

## 2023-12-23 PROCEDURE — 83880 ASSAY OF NATRIURETIC PEPTIDE: CPT

## 2023-12-23 PROCEDURE — 84295 ASSAY OF SERUM SODIUM: CPT

## 2023-12-23 PROCEDURE — 36415 COLL VENOUS BLD VENIPUNCTURE: CPT

## 2023-12-23 PROCEDURE — 87502 INFLUENZA DNA AMP PROBE: CPT

## 2023-12-23 PROCEDURE — 87040 BLOOD CULTURE FOR BACTERIA: CPT

## 2023-12-23 PROCEDURE — 82565 ASSAY OF CREATININE: CPT

## 2023-12-23 PROCEDURE — 82010 KETONE BODYS QUAN: CPT

## 2023-12-23 PROCEDURE — 93005 ELECTROCARDIOGRAM TRACING: CPT

## 2023-12-23 PROCEDURE — 82330 ASSAY OF CALCIUM: CPT

## 2023-12-23 PROCEDURE — 87086 URINE CULTURE/COLONY COUNT: CPT

## 2023-12-23 PROCEDURE — 1090000001 HH PPS REVENUE CREDIT

## 2023-12-23 PROCEDURE — 6370000000 HC RX 637 (ALT 250 FOR IP): Performed by: STUDENT IN AN ORGANIZED HEALTH CARE EDUCATION/TRAINING PROGRAM

## 2023-12-23 PROCEDURE — 82435 ASSAY OF BLOOD CHLORIDE: CPT

## 2023-12-23 PROCEDURE — 6360000004 HC RX CONTRAST MEDICATION: Performed by: STUDENT IN AN ORGANIZED HEALTH CARE EDUCATION/TRAINING PROGRAM

## 2023-12-23 PROCEDURE — 72125 CT NECK SPINE W/O DYE: CPT

## 2023-12-23 PROCEDURE — 82803 BLOOD GASES ANY COMBINATION: CPT

## 2023-12-23 PROCEDURE — 84443 ASSAY THYROID STIM HORMONE: CPT

## 2023-12-23 PROCEDURE — 85014 HEMATOCRIT: CPT

## 2023-12-23 PROCEDURE — 70450 CT HEAD/BRAIN W/O DYE: CPT

## 2023-12-23 PROCEDURE — 83605 ASSAY OF LACTIC ACID: CPT

## 2023-12-23 PROCEDURE — 6360000002 HC RX W HCPCS: Performed by: STUDENT IN AN ORGANIZED HEALTH CARE EDUCATION/TRAINING PROGRAM

## 2023-12-23 PROCEDURE — 83735 ASSAY OF MAGNESIUM: CPT

## 2023-12-23 PROCEDURE — 85025 COMPLETE CBC W/AUTO DIFF WBC: CPT

## 2023-12-23 PROCEDURE — 84132 ASSAY OF SERUM POTASSIUM: CPT

## 2023-12-23 PROCEDURE — 74177 CT ABD & PELVIS W/CONTRAST: CPT

## 2023-12-23 PROCEDURE — 82948 REAGENT STRIP/BLOOD GLUCOSE: CPT

## 2023-12-23 PROCEDURE — 2580000003 HC RX 258: Performed by: STUDENT IN AN ORGANIZED HEALTH CARE EDUCATION/TRAINING PROGRAM

## 2023-12-23 PROCEDURE — 87150 DNA/RNA AMPLIFIED PROBE: CPT

## 2023-12-23 PROCEDURE — 84145 PROCALCITONIN (PCT): CPT

## 2023-12-23 PROCEDURE — 96361 HYDRATE IV INFUSION ADD-ON: CPT

## 2023-12-23 PROCEDURE — 87635 SARS-COV-2 COVID-19 AMP PRB: CPT

## 2023-12-23 PROCEDURE — 82550 ASSAY OF CK (CPK): CPT

## 2023-12-23 PROCEDURE — 1210000000 HC MED SURG R&B

## 2023-12-23 PROCEDURE — 71260 CT THORAX DX C+: CPT

## 2023-12-23 PROCEDURE — 84484 ASSAY OF TROPONIN QUANT: CPT

## 2023-12-23 PROCEDURE — 51702 INSERT TEMP BLADDER CATH: CPT

## 2023-12-23 RX ORDER — POTASSIUM CHLORIDE 20 MEQ/1
40 TABLET, EXTENDED RELEASE ORAL PRN
Status: DISCONTINUED | OUTPATIENT
Start: 2023-12-23 | End: 2023-12-25 | Stop reason: HOSPADM

## 2023-12-23 RX ORDER — ONDANSETRON 4 MG/1
4 TABLET, ORALLY DISINTEGRATING ORAL EVERY 8 HOURS PRN
Status: DISCONTINUED | OUTPATIENT
Start: 2023-12-23 | End: 2023-12-25 | Stop reason: HOSPADM

## 2023-12-23 RX ORDER — SODIUM CHLORIDE 0.9 % (FLUSH) 0.9 %
5-40 SYRINGE (ML) INJECTION EVERY 12 HOURS SCHEDULED
Status: DISCONTINUED | OUTPATIENT
Start: 2023-12-23 | End: 2023-12-25 | Stop reason: HOSPADM

## 2023-12-23 RX ORDER — DEXTROSE MONOHYDRATE 100 MG/ML
INJECTION, SOLUTION INTRAVENOUS CONTINUOUS PRN
Status: DISCONTINUED | OUTPATIENT
Start: 2023-12-23 | End: 2023-12-25 | Stop reason: HOSPADM

## 2023-12-23 RX ORDER — SODIUM CHLORIDE 0.9 % (FLUSH) 0.9 %
5-40 SYRINGE (ML) INJECTION PRN
Status: DISCONTINUED | OUTPATIENT
Start: 2023-12-23 | End: 2023-12-25 | Stop reason: HOSPADM

## 2023-12-23 RX ORDER — SODIUM CHLORIDE, SODIUM LACTATE, POTASSIUM CHLORIDE, CALCIUM CHLORIDE 600; 310; 30; 20 MG/100ML; MG/100ML; MG/100ML; MG/100ML
INJECTION, SOLUTION INTRAVENOUS CONTINUOUS
Status: DISCONTINUED | OUTPATIENT
Start: 2023-12-23 | End: 2023-12-25 | Stop reason: HOSPADM

## 2023-12-23 RX ORDER — POLYETHYLENE GLYCOL 3350 17 G/17G
17 POWDER, FOR SOLUTION ORAL DAILY PRN
Status: DISCONTINUED | OUTPATIENT
Start: 2023-12-23 | End: 2023-12-25 | Stop reason: HOSPADM

## 2023-12-23 RX ORDER — 0.9 % SODIUM CHLORIDE 0.9 %
1000 INTRAVENOUS SOLUTION INTRAVENOUS ONCE
Status: COMPLETED | OUTPATIENT
Start: 2023-12-23 | End: 2023-12-23

## 2023-12-23 RX ORDER — SODIUM CHLORIDE 9 MG/ML
INJECTION, SOLUTION INTRAVENOUS PRN
Status: DISCONTINUED | OUTPATIENT
Start: 2023-12-23 | End: 2023-12-25 | Stop reason: HOSPADM

## 2023-12-23 RX ORDER — INSULIN LISPRO 100 [IU]/ML
0-4 INJECTION, SOLUTION INTRAVENOUS; SUBCUTANEOUS NIGHTLY
Status: DISCONTINUED | OUTPATIENT
Start: 2023-12-23 | End: 2023-12-24

## 2023-12-23 RX ORDER — ACETAMINOPHEN 500 MG
1000 TABLET ORAL ONCE
Status: COMPLETED | OUTPATIENT
Start: 2023-12-23 | End: 2023-12-23

## 2023-12-23 RX ORDER — ACETAMINOPHEN 650 MG/1
650 SUPPOSITORY RECTAL EVERY 6 HOURS PRN
Status: DISCONTINUED | OUTPATIENT
Start: 2023-12-23 | End: 2023-12-25 | Stop reason: HOSPADM

## 2023-12-23 RX ORDER — GLUCAGON 1 MG/ML
1 KIT INJECTION PRN
Status: DISCONTINUED | OUTPATIENT
Start: 2023-12-23 | End: 2023-12-25 | Stop reason: HOSPADM

## 2023-12-23 RX ORDER — ACETAMINOPHEN 325 MG/1
650 TABLET ORAL EVERY 6 HOURS PRN
Status: DISCONTINUED | OUTPATIENT
Start: 2023-12-23 | End: 2023-12-25 | Stop reason: HOSPADM

## 2023-12-23 RX ORDER — MAGNESIUM SULFATE IN WATER 40 MG/ML
2000 INJECTION, SOLUTION INTRAVENOUS PRN
Status: DISCONTINUED | OUTPATIENT
Start: 2023-12-23 | End: 2023-12-25 | Stop reason: HOSPADM

## 2023-12-23 RX ORDER — POTASSIUM CHLORIDE 7.45 MG/ML
10 INJECTION INTRAVENOUS PRN
Status: DISCONTINUED | OUTPATIENT
Start: 2023-12-23 | End: 2023-12-25 | Stop reason: HOSPADM

## 2023-12-23 RX ORDER — ONDANSETRON 2 MG/ML
4 INJECTION INTRAMUSCULAR; INTRAVENOUS EVERY 6 HOURS PRN
Status: DISCONTINUED | OUTPATIENT
Start: 2023-12-23 | End: 2023-12-25 | Stop reason: HOSPADM

## 2023-12-23 RX ORDER — INSULIN LISPRO 100 [IU]/ML
0-8 INJECTION, SOLUTION INTRAVENOUS; SUBCUTANEOUS
Status: DISCONTINUED | OUTPATIENT
Start: 2023-12-24 | End: 2023-12-24

## 2023-12-23 RX ADMIN — IOPAMIDOL 75 ML: 612 INJECTION, SOLUTION INTRAVENOUS at 20:51

## 2023-12-23 RX ADMIN — SODIUM CHLORIDE 1000 ML: 9 INJECTION, SOLUTION INTRAVENOUS at 20:00

## 2023-12-23 RX ADMIN — ACETAMINOPHEN 1000 MG: 500 TABLET ORAL at 21:46

## 2023-12-23 RX ADMIN — INSULIN LISPRO 4 UNITS: 100 INJECTION, SOLUTION INTRAVENOUS; SUBCUTANEOUS at 23:32

## 2023-12-23 RX ADMIN — CEFTRIAXONE SODIUM 1000 MG: 1 INJECTION, POWDER, FOR SOLUTION INTRAMUSCULAR; INTRAVENOUS at 22:02

## 2023-12-23 RX ADMIN — SODIUM CHLORIDE, POTASSIUM CHLORIDE, SODIUM LACTATE AND CALCIUM CHLORIDE: 600; 310; 30; 20 INJECTION, SOLUTION INTRAVENOUS at 23:32

## 2023-12-23 RX ADMIN — SODIUM CHLORIDE 1000 ML: 9 INJECTION, SOLUTION INTRAVENOUS at 21:45

## 2023-12-24 ENCOUNTER — APPOINTMENT (OUTPATIENT)
Age: 78
DRG: 871 | End: 2023-12-24
Attending: INTERNAL MEDICINE
Payer: MEDICARE

## 2023-12-24 VITALS
DIASTOLIC BLOOD PRESSURE: 57 MMHG | SYSTOLIC BLOOD PRESSURE: 105 MMHG | WEIGHT: 108.03 LBS | BODY MASS INDEX: 22.68 KG/M2 | OXYGEN SATURATION: 96 % | HEART RATE: 95 BPM | HEIGHT: 58 IN | RESPIRATION RATE: 18 BRPM | TEMPERATURE: 97.3 F

## 2023-12-24 LAB
A BAUMANNII DNA BLD POS QL NAA+NON-PROBE: NOT DETECTED
ANION GAP SERPL CALCULATED.3IONS-SCNC: 13 MEQ/L (ref 9–15)
B-OH-BUTYR SERPL-SCNC: 9 MG/DL (ref 0.2–2.8)
BACTERIA BLD CULT ORG #2: ABNORMAL
BACTERIA BLD CULT: ABNORMAL
BASOPHILS # BLD: 0 K/UL (ref 0–0.2)
BASOPHILS NFR BLD: 0.2 %
BLACTX-M ISLT/SPM QL: NOT DETECTED
BLAIMP ISLT/SPM QL: NOT DETECTED
BLAKPC ISLT/SPM QL: NOT DETECTED
BLAVIM ISLT/SPM QL: NOT DETECTED
BUN SERPL-MCNC: 38 MG/DL (ref 8–23)
C ALBICANS DNA BLD POS QL NAA+NON-PROBE: NOT DETECTED
C AURIS DNA BLD POS QL NAA+PROBE: NOT DETECTED
C GLABRATA DNA BLD POS QL NAA+NON-PROBE: NOT DETECTED
C KRUSEI DNA BLD POS QL NAA+NON-PROBE: NOT DETECTED
C PARAP DNA BLD POS QL NAA+NON-PROBE: NOT DETECTED
C TROPICLS DNA BLD POS QL NAA+NON-PROBE: NOT DETECTED
CALCIUM SERPL-MCNC: 8.7 MG/DL (ref 8.5–9.9)
CARBAPENEM RESISTANCE NDM GENE BY PCR: NOT DETECTED
CARBAPENEM RESISTANCE OXA-48 GENE BY PCR: NOT DETECTED
CHLORIDE SERPL-SCNC: 102 MEQ/L (ref 95–107)
CO2 SERPL-SCNC: 19 MEQ/L (ref 20–31)
COLISTIN RES MCR-1 ISLT/SPM QL: NOT DETECTED
CREAT SERPL-MCNC: 0.85 MG/DL (ref 0.5–0.9)
CRYPTOCOCCUS NEOFORMANS/GATTII BY PCR: NOT DETECTED
E CLOAC COMP DNA BLD POS NAA+NON-PROBE: NOT DETECTED
E COLI DNA BLD POS QL NAA+NON-PROBE: NOT DETECTED
E FAECALIS DNA BLD POS QL NAA+PROBE: NOT DETECTED
E FAECIUM DNA BLD POS QL NAA+PROBE: NOT DETECTED
ECHO AO ROOT DIAM: 2.9 CM
ECHO AO ROOT INDEX: 2.07 CM/M2
ECHO AV AREA PEAK VELOCITY: 1.5 CM2
ECHO AV AREA VTI: 1.6 CM2
ECHO AV AREA/BSA PEAK VELOCITY: 1.1 CM2/M2
ECHO AV AREA/BSA VTI: 1.1 CM2/M2
ECHO AV MEAN GRADIENT: 4 MMHG
ECHO AV MEAN VELOCITY: 1 M/S
ECHO AV PEAK GRADIENT: 6 MMHG
ECHO AV PEAK VELOCITY: 1.3 M/S
ECHO AV VELOCITY RATIO: 0.69
ECHO AV VTI: 22.5 CM
ECHO BSA: 1.42 M2
ECHO LA DIAMETER INDEX: 1.93 CM/M2
ECHO LA DIAMETER: 2.7 CM
ECHO LA TO AORTIC ROOT RATIO: 0.93
ECHO LA VOL A-L A2C: 45 ML (ref 22–52)
ECHO LA VOL A-L A4C: 73 ML (ref 22–52)
ECHO LA VOL MOD A2C: 42 ML (ref 22–52)
ECHO LA VOL MOD A4C: 69 ML (ref 22–52)
ECHO LA VOLUME AREA LENGTH: 61 ML
ECHO LA VOLUME INDEX A-L A2C: 32 ML/M2 (ref 16–34)
ECHO LA VOLUME INDEX A-L A4C: 52 ML/M2 (ref 16–34)
ECHO LA VOLUME INDEX AREA LENGTH: 44 ML/M2 (ref 16–34)
ECHO LA VOLUME INDEX MOD A2C: 30 ML/M2 (ref 16–34)
ECHO LA VOLUME INDEX MOD A4C: 49 ML/M2 (ref 16–34)
ECHO LV E' LATERAL VELOCITY: 10 CM/S
ECHO LV E' SEPTAL VELOCITY: 7 CM/S
ECHO LV EDV A2C: 76 ML
ECHO LV EDV A4C: 62 ML
ECHO LV EDV BP: 70 ML (ref 56–104)
ECHO LV EDV INDEX A4C: 44 ML/M2
ECHO LV EDV INDEX BP: 50 ML/M2
ECHO LV EDV NDEX A2C: 54 ML/M2
ECHO LV EJECTION FRACTION A2C: 41 %
ECHO LV EJECTION FRACTION A4C: 25 %
ECHO LV EJECTION FRACTION BIPLANE: 34 % (ref 55–100)
ECHO LV ESV A2C: 45 ML
ECHO LV ESV A4C: 47 ML
ECHO LV ESV BP: 46 ML (ref 19–49)
ECHO LV ESV INDEX A2C: 32 ML/M2
ECHO LV ESV INDEX A4C: 34 ML/M2
ECHO LV ESV INDEX BP: 33 ML/M2
ECHO LV FRACTIONAL SHORTENING: 14 % (ref 28–44)
ECHO LV INTERNAL DIMENSION DIASTOLE INDEX: 2.64 CM/M2
ECHO LV INTERNAL DIMENSION DIASTOLIC: 3.7 CM (ref 3.9–5.3)
ECHO LV INTERNAL DIMENSION SYSTOLIC INDEX: 2.29 CM/M2
ECHO LV INTERNAL DIMENSION SYSTOLIC: 3.2 CM
ECHO LV IVSD: 0.9 CM (ref 0.6–0.9)
ECHO LV IVSS: 1.4 CM
ECHO LV MASS 2D: 120.8 G (ref 67–162)
ECHO LV MASS INDEX 2D: 86.3 G/M2 (ref 43–95)
ECHO LV POSTERIOR WALL DIASTOLIC: 1.2 CM (ref 0.6–0.9)
ECHO LV POSTERIOR WALL SYSTOLIC: 1.5 CM
ECHO LV RELATIVE WALL THICKNESS RATIO: 0.65
ECHO LVOT AREA: 2 CM2
ECHO LVOT AV VTI INDEX: 0.74
ECHO LVOT DIAM: 1.6 CM
ECHO LVOT MEAN GRADIENT: 2 MMHG
ECHO LVOT PEAK GRADIENT: 3 MMHG
ECHO LVOT PEAK VELOCITY: 0.9 M/S
ECHO LVOT STROKE VOLUME INDEX: 23.8 ML/M2
ECHO LVOT SV: 33.4 ML
ECHO LVOT VTI: 16.6 CM
ECHO MV E VELOCITY: 1.16 M/S
ECHO MV E/E' LATERAL: 11.6
ECHO MV E/E' RATIO (AVERAGED): 14.09
ECHO RV INTERNAL DIMENSION: 2.4 CM
ECHO RV TAPSE: 1.7 CM (ref 1.7–?)
ECHO TV REGURGITANT MAX VELOCITY: 3.1 M/S
ECHO TV REGURGITANT PEAK GRADIENT: 38 MMHG
ENTEROBACT DNA BLD POS QL NAA+NON-PROBE: DETECTED
ENTEROCOC DNA BLD POS QL NAA+NON-PROBE: DETECTED
EOSINOPHIL # BLD: 0 K/UL (ref 0–0.7)
EOSINOPHIL NFR BLD: 0 %
ERYTHROCYTE [DISTWIDTH] IN BLOOD BY AUTOMATED COUNT: 14.3 % (ref 11.5–14.5)
GLUCOSE BLD-MCNC: 288 MG/DL (ref 70–99)
GLUCOSE BLD-MCNC: 288 MG/DL (ref 70–99)
GLUCOSE SERPL-MCNC: 230 MG/DL (ref 70–99)
GN BLD CULTURE PNL BLD POS NAA+PROBE: NOT DETECTED
GP B STREP DNA BLD POS QL NAA+NON-PROBE: NOT DETECTED
HBA1C MFR BLD: 6.9 % (ref 4.8–5.9)
HCT VFR BLD AUTO: 31.9 % (ref 37–47)
HGB BLD-MCNC: 10.8 G/DL (ref 12–16)
K OXYTOCA DNA BLD POS QL NAA+NON-PROBE: NOT DETECTED
K PNEUMON DNA SPEC QL NAA+PROBE: NOT DETECTED
K. AEROGENES DNA SPEC QL NAA+PROBE: NOT DETECTED
L MONOCYTOG DNA BLD POS QL NAA+NON-PROBE: NOT DETECTED
LYMPHOCYTES # BLD: 0.6 K/UL (ref 1–4.8)
LYMPHOCYTES NFR BLD: 6.2 %
MCH RBC QN AUTO: 30 PG (ref 27–31.3)
MCHC RBC AUTO-ENTMCNC: 33.9 % (ref 33–37)
MCV RBC AUTO: 88.6 FL (ref 79.4–94.8)
MONOCYTES # BLD: 0.7 K/UL (ref 0.2–0.8)
MONOCYTES NFR BLD: 7.1 %
N MEN DNA BLD POS QL NAA+NON-PROBE: NOT DETECTED
NEUTROPHILS # BLD: 8.2 K/UL (ref 1.4–6.5)
NEUTS SEG NFR BLD: 86.1 %
P AERUGINOSA DNA BLD POS NAA+NON-PROBE: NOT DETECTED
PERFORMED ON: ABNORMAL
PLATELET # BLD AUTO: 381 K/UL (ref 130–400)
POC CREATININE: 1.2 MG/DL (ref 0.6–1.2)
POC SAMPLE TYPE: ABNORMAL
POTASSIUM SERPL-SCNC: 3.9 MEQ/L (ref 3.4–4.9)
PROTEUS SP DNA BLD POS QL NAA+NON-PROBE: NOT DETECTED
RBC # BLD AUTO: 3.6 M/UL (ref 4.2–5.4)
S AUREUS DNA BLD POS QL NAA+NON-PROBE: NOT DETECTED
S AUREUS+CONS DNA BLD POS NAA+NON-PROBE: NOT DETECTED
S EPIDERMIDIS DNA BLD POS QL NAA+PROBE: NOT DETECTED
S LUGDUNENSIS DNA BLD POS QL NAA+PROBE: NOT DETECTED
S MALTOPH DNA BLD POS QL NAA+PROBE: NOT DETECTED
S MARCESCENS DNA BLD POS NAA+NON-PROBE: NOT DETECTED
S PNEUM DNA BLD POS QL NAA+NON-PROBE: NOT DETECTED
S PYO DNA BLD POS QL NAA+NON-PROBE: NOT DETECTED
SALMONELLA DNA BLD POS QL NAA+PROBE: NOT DETECTED
SODIUM SERPL-SCNC: 134 MEQ/L (ref 135–144)
STREPTOCOCCUS DNA BLD POS NAA+NON-PROBE: NOT DETECTED
TROPONIN, HIGH SENSITIVITY: 218 NG/L (ref 0–19)
TROPONIN, HIGH SENSITIVITY: 277 NG/L (ref 0–19)
WBC # BLD AUTO: 9.5 K/UL (ref 4.8–10.8)

## 2023-12-24 PROCEDURE — 92950 HEART/LUNG RESUSCITATION CPR: CPT

## 2023-12-24 PROCEDURE — 1210000000 HC MED SURG R&B

## 2023-12-24 PROCEDURE — 0BH17EZ INSERTION OF ENDOTRACHEAL AIRWAY INTO TRACHEA, VIA NATURAL OR ARTIFICIAL OPENING: ICD-10-PCS | Performed by: INTERNAL MEDICINE

## 2023-12-24 PROCEDURE — 1090000001 HH PPS REVENUE CREDIT

## 2023-12-24 PROCEDURE — 93306 TTE W/DOPPLER COMPLETE: CPT

## 2023-12-24 PROCEDURE — 85025 COMPLETE CBC W/AUTO DIFF WBC: CPT

## 2023-12-24 PROCEDURE — 87077 CULTURE AEROBIC IDENTIFY: CPT

## 2023-12-24 PROCEDURE — 97162 PT EVAL MOD COMPLEX 30 MIN: CPT

## 2023-12-24 PROCEDURE — 5A1935Z RESPIRATORY VENTILATION, LESS THAN 24 CONSECUTIVE HOURS: ICD-10-PCS | Performed by: INTERNAL MEDICINE

## 2023-12-24 PROCEDURE — 99223 1ST HOSP IP/OBS HIGH 75: CPT | Performed by: INTERNAL MEDICINE

## 2023-12-24 PROCEDURE — 5A12012 PERFORMANCE OF CARDIAC OUTPUT, SINGLE, MANUAL: ICD-10-PCS | Performed by: INTERNAL MEDICINE

## 2023-12-24 PROCEDURE — 84484 ASSAY OF TROPONIN QUANT: CPT

## 2023-12-24 PROCEDURE — 6370000000 HC RX 637 (ALT 250 FOR IP): Performed by: STUDENT IN AN ORGANIZED HEALTH CARE EDUCATION/TRAINING PROGRAM

## 2023-12-24 PROCEDURE — 31500 INSERT EMERGENCY AIRWAY: CPT

## 2023-12-24 PROCEDURE — 97166 OT EVAL MOD COMPLEX 45 MIN: CPT

## 2023-12-24 PROCEDURE — 6370000000 HC RX 637 (ALT 250 FOR IP): Performed by: INTERNAL MEDICINE

## 2023-12-24 PROCEDURE — 1090000002 HH PPS REVENUE DEBIT

## 2023-12-24 PROCEDURE — 80048 BASIC METABOLIC PNL TOTAL CA: CPT

## 2023-12-24 PROCEDURE — 83036 HEMOGLOBIN GLYCOSYLATED A1C: CPT

## 2023-12-24 PROCEDURE — 93306 TTE W/DOPPLER COMPLETE: CPT | Performed by: INTERNAL MEDICINE

## 2023-12-24 PROCEDURE — 36415 COLL VENOUS BLD VENIPUNCTURE: CPT

## 2023-12-24 PROCEDURE — 2580000003 HC RX 258: Performed by: STUDENT IN AN ORGANIZED HEALTH CARE EDUCATION/TRAINING PROGRAM

## 2023-12-24 RX ORDER — ENOXAPARIN SODIUM 100 MG/ML
1 INJECTION SUBCUTANEOUS 2 TIMES DAILY
Status: DISCONTINUED | OUTPATIENT
Start: 2023-12-24 | End: 2023-12-25 | Stop reason: HOSPADM

## 2023-12-24 RX ORDER — INSULIN LISPRO 100 [IU]/ML
0-4 INJECTION, SOLUTION INTRAVENOUS; SUBCUTANEOUS NIGHTLY
Status: DISCONTINUED | OUTPATIENT
Start: 2023-12-24 | End: 2023-12-25 | Stop reason: HOSPADM

## 2023-12-24 RX ORDER — LOSARTAN POTASSIUM 25 MG/1
12.5 TABLET ORAL DAILY
Status: DISCONTINUED | OUTPATIENT
Start: 2023-12-24 | End: 2023-12-25 | Stop reason: HOSPADM

## 2023-12-24 RX ORDER — INSULIN LISPRO 100 [IU]/ML
0-16 INJECTION, SOLUTION INTRAVENOUS; SUBCUTANEOUS
Status: DISCONTINUED | OUTPATIENT
Start: 2023-12-24 | End: 2023-12-25 | Stop reason: HOSPADM

## 2023-12-24 RX ORDER — ASPIRIN 81 MG/1
81 TABLET ORAL DAILY
Status: DISCONTINUED | OUTPATIENT
Start: 2023-12-24 | End: 2023-12-25 | Stop reason: HOSPADM

## 2023-12-24 RX ADMIN — SODIUM CHLORIDE, POTASSIUM CHLORIDE, SODIUM LACTATE AND CALCIUM CHLORIDE: 600; 310; 30; 20 INJECTION, SOLUTION INTRAVENOUS at 09:53

## 2023-12-24 RX ADMIN — INSULIN LISPRO 8 UNITS: 100 INJECTION, SOLUTION INTRAVENOUS; SUBCUTANEOUS at 16:45

## 2023-12-24 RX ADMIN — Medication 10 ML: at 11:15

## 2023-12-24 RX ADMIN — LOSARTAN POTASSIUM 12.5 MG: 25 TABLET, FILM COATED ORAL at 16:10

## 2023-12-24 RX ADMIN — ASPIRIN 81 MG: 81 TABLET, COATED ORAL at 16:10

## 2023-12-24 NOTE — CARE COORDINATION
PLAN SNF. #[facility] #2 FAMILY TO [address]. The Plan for Transition of Care is related to the following treatment goals of Dehydration [E86.0]  Septicemia ([address]) [A41.9]  NSTEMI (non-ST elevated myocardial infarction) ([address]) [I21.4]  Sepsis ([address]) [A41.9]  Urinary tract infection with hematuria, site unspecified [N39.0, R31.9]  Altered mental status, unspecified altered mental status type [R58.93]    IF APPLICABLE: The Patient and/or patient representative Mervat and her family were provided with a choice of provider and agrees with the discharge plan. Freedom of choice list with basic dialogue that supports the patient's individualized plan of care/goals and shares the quality data associated with the providers was provided to: Patient Representative   Patient Representative Name: Michael Rosenthal     The Patient and/or Patient Representative Agree with the Discharge Plan?  Yes (#1 [facility] #2 FAMILY TO DECIDE)    Timothy Son, RN, BSN  Case Management Department

## 2023-12-24 NOTE — ACP (ADVANCE CARE PLANNING)
Advance Care Planning   Healthcare Decision Maker:    Primary Decision Maker: day cruz - Child - [phone]    Secondary Decision Maker: Jessica Sexton - Child - [phone]    Secondary Decision Maker: Vadim Marrero - Daughter-in-Law - [phone]    Click here to complete Healthcare Decision Makers including selection of the Healthcare Decision Maker Relationship (ie \"Primary\"). Confirmed with dtr-in-law Laurita Barnes, she reports Gabby Mutters, her  is POA he will bring in document.      Electronically signed by Rm Castro RN, BSN on 12/24/2023 at 3:45 PM

## 2023-12-24 NOTE — PLAN OF CARE
See OT evaluation for all goals and OT POC.  Electronically signed by Dionne Francois OT on 12/24/2023 at 11:57 AM

## 2023-12-24 NOTE — H&P
process  -Management as above  -Trend daily     Elevated troponin- Hs-Trop up to 300 in ED. No chest pain reported.  Likely demand ischemia secondary to above   -Cardiology consult  -Telemetry  -Continue trending    Hyperglycemia  -SSI while inpatient  -Carb controlled diet       Chronic Illnesses  HTN  -Hold home antihypertensives in setting of sepsis    HLD  -Restart home meds once med rec complete     VTE Prophylaxis: holding until CT head returns          SIGNATURE: Miguel Cao MD  DATE: December 23, 2023  TIME: 11:01 PM

## 2023-12-25 PROCEDURE — 1090000002 HH PPS REVENUE DEBIT

## 2023-12-25 PROCEDURE — 1090000001 HH PPS REVENUE CREDIT

## 2023-12-25 NOTE — FLOWSHEET NOTE
Code blue note:    @2048 Code blue called. CPR initiated. @7427 Dr. Marissa Greenberg and elissa hawkins team arrived. @2055 Epi IV given. @2056 Pulse check, asystole on monitor and pulse check is negative, cpr resumed. @2056 Bicarb IV pushed. @2057 Epi IV pushed. @2058 D50 IV pushed    @2100 pulse check asystole on monitor and pulse check negative. Cpr resumed. @2100 Intubated successfully, epic IV pushed. @2101 Code ended.

## 2023-12-26 ENCOUNTER — HOME CARE VISIT (OUTPATIENT)
Dept: HOME HEALTH SERVICES | Facility: HOME HEALTH | Age: 78
End: 2023-12-26
Payer: MEDICARE

## 2023-12-26 ENCOUNTER — TELEPHONE (OUTPATIENT)
Dept: PRIMARY CARE | Facility: CLINIC | Age: 78
End: 2023-12-26
Payer: MEDICARE

## 2023-12-26 LAB
CULTURE, BLOOD ID SENSITIVITY: ABNORMAL
ORGANISM: ABNORMAL
ORGANISM: ABNORMAL

## 2023-12-26 PROCEDURE — 1090000001 HH PPS REVENUE CREDIT

## 2023-12-26 PROCEDURE — 1090000002 HH PPS REVENUE DEBIT

## 2023-12-26 NOTE — TELEPHONE ENCOUNTER
Patient's daughter-in-law phones the office today to inform GRACY that Mirta passed away on Sunday, 12-24-23 at [facility].

## 2023-12-27 LAB
BACTERIA UR CULT: ABNORMAL
BACTERIA UR CULT: ABNORMAL
EKG ATRIAL RATE: 106 BPM
EKG P AXIS: 55 DEGREES
EKG P-R INTERVAL: 278 MS
EKG Q-T INTERVAL: 294 MS
EKG QRS DURATION: 104 MS
EKG QTC CALCULATION (BAZETT): 390 MS
EKG R AXIS: 14 DEGREES
EKG T AXIS: 142 DEGREES
EKG VENTRICULAR RATE: 106 BPM
ORGANISM: ABNORMAL

## 2023-12-27 PROCEDURE — 1090000001 HH PPS REVENUE CREDIT

## 2023-12-27 PROCEDURE — 1090000002 HH PPS REVENUE DEBIT

## 2023-12-28 ENCOUNTER — HOME CARE VISIT (OUTPATIENT)
Dept: HOME HEALTH SERVICES | Facility: HOME HEALTH | Age: 78
End: 2023-12-28
Payer: MEDICARE

## 2023-12-28 ENCOUNTER — APPOINTMENT (OUTPATIENT)
Dept: RADIOLOGY | Facility: HOSPITAL | Age: 78
End: 2023-12-28
Payer: MEDICARE

## 2023-12-28 PROCEDURE — 1090000001 HH PPS REVENUE CREDIT

## 2023-12-28 PROCEDURE — 1090000002 HH PPS REVENUE DEBIT

## 2023-12-29 PROCEDURE — 1090000001 HH PPS REVENUE CREDIT

## 2023-12-29 PROCEDURE — 1090000002 HH PPS REVENUE DEBIT

## 2023-12-30 PROCEDURE — 1090000002 HH PPS REVENUE DEBIT

## 2023-12-30 PROCEDURE — 1090000001 HH PPS REVENUE CREDIT

## 2023-12-31 PROCEDURE — 1090000002 HH PPS REVENUE DEBIT

## 2023-12-31 PROCEDURE — 1090000001 HH PPS REVENUE CREDIT

## 2024-01-01 PROCEDURE — 1090000001 HH PPS REVENUE CREDIT

## 2024-01-01 PROCEDURE — 1090000002 HH PPS REVENUE DEBIT

## 2024-01-02 PROCEDURE — 1090000002 HH PPS REVENUE DEBIT

## 2024-01-02 PROCEDURE — 1090000001 HH PPS REVENUE CREDIT

## 2024-01-03 PROCEDURE — 1090000002 HH PPS REVENUE DEBIT

## 2024-01-03 PROCEDURE — 1090000001 HH PPS REVENUE CREDIT

## 2024-04-05 ENCOUNTER — APPOINTMENT (OUTPATIENT)
Dept: PRIMARY CARE | Facility: CLINIC | Age: 79
End: 2024-04-05
Payer: MEDICARE

## 2024-05-17 ENCOUNTER — APPOINTMENT (OUTPATIENT)
Dept: HEMATOLOGY/ONCOLOGY | Facility: CLINIC | Age: 79
End: 2024-05-17
Payer: MEDICARE